# Patient Record
Sex: MALE | Race: WHITE | NOT HISPANIC OR LATINO | Employment: FULL TIME | URBAN - NONMETROPOLITAN AREA
[De-identification: names, ages, dates, MRNs, and addresses within clinical notes are randomized per-mention and may not be internally consistent; named-entity substitution may affect disease eponyms.]

---

## 2017-10-09 ENCOUNTER — OFFICE VISIT - GICH (OUTPATIENT)
Dept: FAMILY MEDICINE | Facility: OTHER | Age: 47
End: 2017-10-09

## 2017-10-09 ENCOUNTER — HISTORY (OUTPATIENT)
Dept: FAMILY MEDICINE | Facility: OTHER | Age: 47
End: 2017-10-09

## 2017-10-09 DIAGNOSIS — L57.0 ACTINIC KERATOSIS: ICD-10-CM

## 2017-10-09 DIAGNOSIS — Z00.00 ENCOUNTER FOR GENERAL ADULT MEDICAL EXAMINATION WITHOUT ABNORMAL FINDINGS: ICD-10-CM

## 2017-10-11 ENCOUNTER — HOSPITAL ENCOUNTER (OUTPATIENT)
Dept: LAB | Facility: OTHER | Age: 47
End: 2017-10-11
Attending: FAMILY MEDICINE | Admitting: FAMILY MEDICINE

## 2017-10-11 DIAGNOSIS — Z00.00 ENCOUNTER FOR GENERAL ADULT MEDICAL EXAMINATION WITHOUT ABNORMAL FINDINGS: ICD-10-CM

## 2017-10-11 LAB
A/G RATIO - HISTORICAL: 1.7 (ref 1–2)
ALBUMIN SERPL-MCNC: 4.7 G/DL (ref 3.5–5.7)
ALP SERPL-CCNC: 84 IU/L (ref 34–104)
ALT (SGPT) - HISTORICAL: 31 IU/L (ref 7–52)
ANION GAP - HISTORICAL: 5 (ref 5–18)
AST SERPL-CCNC: 27 IU/L (ref 13–39)
BILIRUB SERPL-MCNC: 0.5 MG/DL (ref 0.3–1)
BUN SERPL-MCNC: 26 MG/DL (ref 7–25)
BUN/CREAT RATIO - HISTORICAL: 20
CALCIUM SERPL-MCNC: 9.9 MG/DL (ref 8.6–10.3)
CHLORIDE SERPLBLD-SCNC: 107 MMOL/L (ref 98–107)
CHOL/HDL RATIO - HISTORICAL: 4.4
CHOLESTEROL TOTAL: 251 MG/DL
CO2 SERPL-SCNC: 25 MMOL/L (ref 21–31)
CREAT SERPL-MCNC: 1.27 MG/DL (ref 0.7–1.3)
GFR IF NOT AFRICAN AMERICAN - HISTORICAL: >60 ML/MIN/1.73M2
GLOBULIN - HISTORICAL: 2.7 G/DL (ref 2–3.7)
GLUCOSE SERPL-MCNC: 95 MG/DL (ref 70–105)
HDLC SERPL-MCNC: 57 MG/DL (ref 23–92)
LDLC SERPL CALC-MCNC: 156 MG/DL
NON-HDL CHOLESTEROL - HISTORICAL: 194 MG/DL
POTASSIUM SERPL-SCNC: 4.3 MMOL/L (ref 3.5–5.1)
PROT SERPL-MCNC: 7.4 G/DL (ref 6.4–8.9)
PROVIDER ORDERDED STATUS - HISTORICAL: ABNORMAL
PSA TOTAL (DIAGNOSTIC) - HISTORICAL: 0.78 NG/ML
SODIUM SERPL-SCNC: 137 MMOL/L (ref 133–143)
TRIGL SERPL-MCNC: 190 MG/DL

## 2017-12-28 NOTE — PROGRESS NOTES
Patient Information     Patient Name MRN Sex Woody Khan [id number] Male 1970      Progress Notes by Calixto Mckay MD at 10/9/2017  3:00 PM     Author:  Calixto Mckay MD Service:  (none) Author Type:  Physician     Filed:  10/9/2017  4:38 PM Encounter Date:  10/9/2017 Status:  Signed     :  Calixto Mckay MD (Physician)            SUBJECTIVE:    Woody Graves is a 47 y.o. male who presents for physical    HPI: He has no acute concerns, he has a history of high cholesterol.  He has two scaled and pink patches on his forehead, one which slightly projects from his left side of forehead.    PROBLEM LIST:  There is no problem list on file for this patient.    PAST MEDICAL HISTORY:No past medical history on file.  SURGICAL HISTORY:  No past surgical history on file.    SOCIAL HISTORY:  Social History     Social History        Marital status:       Spouse name: N/A     Number of children:  N/A     Years of education:  N/A     Occupational History      Not on file.     Social History Main Topics         Smoking status:   Current Some Day Smoker     Smokeless tobacco:   Never Used      Comment: cigars      Alcohol use   Yes      Comment: 6-8 per day on weekends      Drug use:   Not on file     Sexual activity:   Not on file     Other Topics  Concern     Not on file      Social History Narrative     Has had issues with alcohol, binge drinking 10y ago, went to AA.  Now doing well.  , 3 kids age 19, 17, 12.  Work at [employer].             FAMILY HISTORY:  Family History       Problem   Relation Age of Onset     Heart Disease  Father      87       Good Health  Mother      77 - cancer primary unknown       Other  Brother      depression and obestity - age 56       Good Health  Sister      55 and 51         CURRENT MEDICATIONS:   No current outpatient prescriptions on file.     No current facility-administered medications for this visit.   "    Medications have been reviewed by me and are current to the best of my knowledge and ability.    ALLERGIES:  Review of patient's allergies indicates no known allergies.    REVIEW OF SYSTEMS:  General: denies any general problems.  Eyes: denies problems  Ears/Nose/Throat: denies problems  Cardiovascular: denies problems  Respiratory: denies problems  Gastrointestinal: denies problems  Genitourinary: denies problems  Musculoskeletal: denies problems  Skin: denies problems  Neurologic: denies problems  Psychiatric: denies problems  Endocrine: denies problems  Heme/Lymphatic: denies problems  Allergic/Immunologic: denies problems  PHQ Depression Screening 10/9/2017   Date of PHQ exam (doc flow) 10/9/2017   1. Lack of interest/pleasure 0 - Not at all   2. Feeling down/depressed 0 - Not at all   PHQ-2 TOTAL SCORE 0   Some recent data might be hidden       OBJECTIVE:  /82  Pulse 84  Ht 1.765 m (5' 9.5\")  Wt 96.6 kg (213 lb)  BMI 31 kg/m2  EXAM:   General Appearance: Pleasant, alert, appropriate appearance for age. No acute distress  Head Exam: Normal. Normocephalic, atraumatic.  Eye Exam:  Normal external eye, conjunctiva, lids, cornea. JANAK.  Fundoscopic Exam: Normal red reflex and fundoscopic exam.  Ear Exam: Normal TM's bilaterally. Normal auditory canals and external ears. Non-tender.  Nose Exam: Normal external nose, mucus membranes, and septum.  OroPharynx Exam:  Dental hygiene adequate. Normal buccal mucosa. Normal pharynx.  Neck Exam:  Supple, no masses or nodes.  Thyroid Exam: No nodules or enlargement.  Chest/Respiratory Exam: Normal chest wall and respirations. Clear to auscultation.  Breast Exam: No dimpling, nipple retraction or discharge. No masses or nodes.  Cardiovascular Exam: Regular rate and rhythm. S1, S2, no murmur, click, gallop, or rubs.  Gastrointestinal Exam: Soft, non-tender, no masses or organomegaly.  Rectal Exam: Normal sphincter tone. No masses noted.  Genitourinary Exam Male: " Normal male genitalia. No discharge or penile ulcerations. No testicular masses or swelling.  Lymphatic Exam: Non-palpable nodes in neck, clavicular, axillary, or inguinal regions.  Musculoskeletal Exam: Back is straight and non-tender, full ROM of upper and lower extremities.  Foot Exam: Left and right foot: good pedal pulses, no lesions, nail hygiene good.  Skin: 1x2mm AK on forehead, 1mm AK vs Acanthosis  Neurologic Exam: Nonfocal, symmetric DTRs, normal gross motor, tone coordination and no tremor.  Psychiatric Exam: Alert and oriented - appropriate affect.          ASSESSMENT/PLAN:    ICD-10-CM    1. Physical exam Z00.00 PSA, TOTAL      LIPID PANEL      COMPLETE METABOLIC PANEL   2. AK (actinic keratosis) L57.0 Patient requests cryotherapy destruction.  Risks, benefits and alternatives discussed with patient.  They voiced good understanding and had the opportunity to ask questions.  Patient gave consent and proceed with procedure. Three cycles of cryotherapy performed.  Follow up for excisional biopsy if does not resolve, or if recurs.      Mr. De Souza Body mass index is 31 kg/(m^2). This is out of the normal range for a 47 y.o. Normal range for ages 18+ is between 18.5 and 24.9. To lose weight we reviewed risks and benefits of appropriate options such as diet, exercise, and medications. Patient's strategy will be  self-directed nutrition plan and self-directed exercise program  BP Readings from Last 1 Encounters:10/09/17 : 124/82  Mr. De Souza blood pressure is out of the normal range for adults. Per JNC-8 guidelines normal adult blood pressure is < 120/80, pre-hypertensive is between 120/80 and 139/89, and hypertension is 140/90 or greater. Risks of hypertension were discussed. Patient's strategy will be weight loss and reduced salt intake.    Calixto Mckay MD

## 2017-12-30 NOTE — NURSING NOTE
Patient Information     Patient Name MRN Woody Byrd [id number] Male 1970      Nursing Note by Vanesa Hernandez at 10/9/2017  3:00 PM     Author:  Vanesa Hernandez Service:  (none) Author Type:  (none)     Filed:  10/9/2017  3:22 PM Encounter Date:  10/9/2017 Status:  Signed     :  Vanesa Hernandez            Patient presents to the clinic today for a physical.

## 2018-01-25 ENCOUNTER — DOCUMENTATION ONLY (OUTPATIENT)
Dept: FAMILY MEDICINE | Facility: OTHER | Age: 48
End: 2018-01-25

## 2018-01-27 VITALS
HEART RATE: 84 BPM | WEIGHT: 213 LBS | DIASTOLIC BLOOD PRESSURE: 82 MMHG | BODY MASS INDEX: 30.49 KG/M2 | SYSTOLIC BLOOD PRESSURE: 124 MMHG | HEIGHT: 70 IN

## 2018-07-23 NOTE — PROGRESS NOTES
Patient Information     Patient Name  Woody Graves MRN  [id number] Sex  Male   1970      Letter by Calixto Mckay MD at      Author:  Calixto Mckay MD Service:  (none) Author Type:  (none)    Filed:   Date of Service:   Status:  (Other)         Woody Graves  [address] [facility] [address]    10/19/2017      Dear Mr. Graves,    Your lab results are listed below and are acceptable/stable. Your triglyercides and LDL are annmarie but so is the good HDL cholesterol.  Your 10y risk of ASCVD is about 2.9% which is low.  I would suggest focusing on eating veggies and lean meats and avoiding simple carbs. This can lower your triglycerides and LDL.  I would recheck your cholesterol again in 1 yr.  PSA is normal and so is glucose levels.    Contact us with any questions.    I'm sending along your actual numbers so that you will have them for your general interest and your records.       Take Care,   Calixto Mckay MD      Resulted Orders      PSA, TOTAL (Collected: 10/11/2017  7:25 AM)      Result  Value Ref Range    PSA TOTAL (DIAGNOSTIC) 0.781 <=3.100 ng/mL    Narrative      The percentage of Free PSA can be used to enhance the   differentiation of prostate cancer from benign prostatic  disease in subjects whose PSA levels are between 4.00 and  10.00 ng/mL.      The DXI PSA assay is a Chemiluminescent Assay.  Assay values obtained with different assay   methods cannot be used interchangeably due to differences  in assay methods and reagent specificity.       LIPID PANEL (Collected: 10/11/2017  7:25 AM)      Result  Value Ref Range    CHOLESTEROL,TOTAL 251 (H) <200 mg/dL    TRIGLYCERIDES 190 (H) <150 mg/dL    HDL CHOLESTEROL 57 23 - 92 mg/dL    NON-HDL CHOLESTEROL 194 (H) <145 mg/dl    CHOL/HDL RATIO            4.40 <4.50                    LDL CHOLESTEROL 156 (H) <100 mg/dL    PROVIDER ORDERED STATUS FASTING    COMPLETE METABOLIC PANEL (Collected: 10/11/2017  7:25 AM)      Result   Value Ref Range    SODIUM 137 133 - 143 mmol/L    POTASSIUM 4.3 3.5 - 5.1 mmol/L    CHLORIDE 107 98 - 107 mmol/L    CO2,TOTAL 25 21 - 31 mmol/L    ANION GAP 5 5 - 18                    GLUCOSE 95 70 - 105 mg/dL    CALCIUM 9.9 8.6 - 10.3 mg/dL    BUN 26 (H) 7 - 25 mg/dL    CREATININE 1.27 0.70 - 1.30 mg/dL    BUN/CREAT RATIO           20                    GFR if African American >60 >60 ml/min/1.73m2    GFR if not African American >60 >60 ml/min/1.73m2    ALBUMIN 4.7 3.5 - 5.7 g/dL    PROTEIN,TOTAL 7.4 6.4 - 8.9 g/dL    GLOBULIN                  2.7 2.0 - 3.7 g/dL    A/G RATIO 1.7 1.0 - 2.0                    BILIRUBIN,TOTAL 0.5 0.3 - 1.0 mg/dL    ALK PHOSPHATASE 84 34 - 104 IU/L    ALT (SGPT) 31 7 - 52 IU/L    AST (SGOT) 27 13 - 39 IU/L

## 2020-01-11 ENCOUNTER — APPOINTMENT (OUTPATIENT)
Dept: GENERAL RADIOLOGY | Facility: OTHER | Age: 50
End: 2020-01-11
Attending: FAMILY MEDICINE
Payer: COMMERCIAL

## 2020-01-11 ENCOUNTER — HOSPITAL ENCOUNTER (EMERGENCY)
Facility: OTHER | Age: 50
Discharge: OTHER INSTITUTION | End: 2020-01-11
Attending: FAMILY MEDICINE | Admitting: FAMILY MEDICINE
Payer: COMMERCIAL

## 2020-01-11 ENCOUNTER — TRANSFERRED RECORDS (OUTPATIENT)
Dept: HEALTH INFORMATION MANAGEMENT | Facility: OTHER | Age: 50
End: 2020-01-11

## 2020-01-11 VITALS
HEIGHT: 71 IN | OXYGEN SATURATION: 100 % | WEIGHT: 225 LBS | RESPIRATION RATE: 18 BRPM | SYSTOLIC BLOOD PRESSURE: 137 MMHG | HEART RATE: 93 BPM | BODY MASS INDEX: 31.5 KG/M2 | TEMPERATURE: 96.5 F | DIASTOLIC BLOOD PRESSURE: 90 MMHG

## 2020-01-11 DIAGNOSIS — I21.09 ACUTE ST ELEVATION MYOCARDIAL INFARCTION (STEMI) OF ANTERIOR WALL (H): ICD-10-CM

## 2020-01-11 LAB
ALBUMIN SERPL-MCNC: 4.9 G/DL (ref 3.5–5.7)
ALP SERPL-CCNC: 70 U/L (ref 34–104)
ALT SERPL W P-5'-P-CCNC: 21 U/L (ref 7–52)
ANION GAP SERPL CALCULATED.3IONS-SCNC: 12 MMOL/L (ref 3–14)
APTT PPP: 131 SEC (ref 22–37)
AST SERPL W P-5'-P-CCNC: 20 U/L (ref 13–39)
BASOPHILS # BLD AUTO: 0.1 10E9/L (ref 0–0.2)
BASOPHILS NFR BLD AUTO: 0.7 %
BILIRUB SERPL-MCNC: 0.5 MG/DL (ref 0.3–1)
BUN SERPL-MCNC: 23 MG/DL (ref 7–25)
CALCIUM SERPL-MCNC: 9.8 MG/DL (ref 8.6–10.3)
CHLORIDE SERPL-SCNC: 103 MMOL/L (ref 98–107)
CO2 SERPL-SCNC: 24 MMOL/L (ref 21–31)
CREAT SERPL-MCNC: 1.33 MG/DL (ref 0.7–1.3)
DIFFERENTIAL METHOD BLD: ABNORMAL
EOSINOPHIL # BLD AUTO: 0.1 10E9/L (ref 0–0.7)
EOSINOPHIL NFR BLD AUTO: 1.1 %
ERYTHROCYTE [DISTWIDTH] IN BLOOD BY AUTOMATED COUNT: 12.5 % (ref 10–15)
GFR SERPL CREATININE-BSD FRML MDRD: 57 ML/MIN/{1.73_M2}
GLUCOSE SERPL-MCNC: 128 MG/DL (ref 70–105)
HCT VFR BLD AUTO: 48.4 % (ref 40–53)
HGB BLD-MCNC: 16.4 G/DL (ref 13.3–17.7)
IMM GRANULOCYTES # BLD: 0.1 10E9/L (ref 0–0.4)
IMM GRANULOCYTES NFR BLD: 0.4 %
INR PPP: 0.99 (ref 0–1.3)
LYMPHOCYTES # BLD AUTO: 1.8 10E9/L (ref 0.8–5.3)
LYMPHOCYTES NFR BLD AUTO: 14.1 %
MCH RBC QN AUTO: 29.5 PG (ref 26.5–33)
MCHC RBC AUTO-ENTMCNC: 33.9 G/DL (ref 31.5–36.5)
MCV RBC AUTO: 87 FL (ref 78–100)
MONOCYTES # BLD AUTO: 0.9 10E9/L (ref 0–1.3)
MONOCYTES NFR BLD AUTO: 7.3 %
NEUTROPHILS # BLD AUTO: 9.9 10E9/L (ref 1.6–8.3)
NEUTROPHILS NFR BLD AUTO: 76.4 %
PLATELET # BLD AUTO: 288 10E9/L (ref 150–450)
POTASSIUM SERPL-SCNC: 4 MMOL/L (ref 3.5–5.1)
PROT SERPL-MCNC: 7.1 G/DL (ref 6.4–8.9)
RBC # BLD AUTO: 5.55 10E12/L (ref 4.4–5.9)
SODIUM SERPL-SCNC: 139 MMOL/L (ref 134–144)
TROPONIN I SERPL-MCNC: 377.1 PG/ML
WBC # BLD AUTO: 13 10E9/L (ref 4–11)

## 2020-01-11 PROCEDURE — 25800030 ZZH RX IP 258 OP 636: Performed by: FAMILY MEDICINE

## 2020-01-11 PROCEDURE — 85610 PROTHROMBIN TIME: CPT | Performed by: FAMILY MEDICINE

## 2020-01-11 PROCEDURE — 93010 ELECTROCARDIOGRAM REPORT: CPT | Performed by: INTERNAL MEDICINE

## 2020-01-11 PROCEDURE — 80053 COMPREHEN METABOLIC PANEL: CPT | Performed by: FAMILY MEDICINE

## 2020-01-11 PROCEDURE — 99291 CRITICAL CARE FIRST HOUR: CPT | Performed by: FAMILY MEDICINE

## 2020-01-11 PROCEDURE — 96374 THER/PROPH/DIAG INJ IV PUSH: CPT | Performed by: FAMILY MEDICINE

## 2020-01-11 PROCEDURE — 85730 THROMBOPLASTIN TIME PARTIAL: CPT | Performed by: FAMILY MEDICINE

## 2020-01-11 PROCEDURE — 25000132 ZZH RX MED GY IP 250 OP 250 PS 637: Performed by: FAMILY MEDICINE

## 2020-01-11 PROCEDURE — 71045 X-RAY EXAM CHEST 1 VIEW: CPT

## 2020-01-11 PROCEDURE — 85025 COMPLETE CBC W/AUTO DIFF WBC: CPT | Performed by: FAMILY MEDICINE

## 2020-01-11 PROCEDURE — 25000128 H RX IP 250 OP 636: Performed by: FAMILY MEDICINE

## 2020-01-11 PROCEDURE — 84484 ASSAY OF TROPONIN QUANT: CPT | Performed by: FAMILY MEDICINE

## 2020-01-11 PROCEDURE — 93005 ELECTROCARDIOGRAM TRACING: CPT | Performed by: FAMILY MEDICINE

## 2020-01-11 PROCEDURE — 96375 TX/PRO/DX INJ NEW DRUG ADDON: CPT | Performed by: FAMILY MEDICINE

## 2020-01-11 PROCEDURE — 36415 COLL VENOUS BLD VENIPUNCTURE: CPT | Performed by: FAMILY MEDICINE

## 2020-01-11 PROCEDURE — 99291 CRITICAL CARE FIRST HOUR: CPT | Mod: Z6 | Performed by: FAMILY MEDICINE

## 2020-01-11 RX ORDER — NALOXONE HYDROCHLORIDE 0.4 MG/ML
.1-.4 INJECTION, SOLUTION INTRAMUSCULAR; INTRAVENOUS; SUBCUTANEOUS
Status: DISCONTINUED | OUTPATIENT
Start: 2020-01-11 | End: 2020-01-11 | Stop reason: HOSPADM

## 2020-01-11 RX ORDER — METOPROLOL TARTRATE 25 MG/1
25 TABLET, FILM COATED ORAL ONCE
Status: COMPLETED | OUTPATIENT
Start: 2020-01-11 | End: 2020-01-11

## 2020-01-11 RX ORDER — MORPHINE SULFATE 2 MG/ML
2 INJECTION, SOLUTION INTRAMUSCULAR; INTRAVENOUS
Status: COMPLETED | OUTPATIENT
Start: 2020-01-11 | End: 2020-01-11

## 2020-01-11 RX ORDER — SODIUM CHLORIDE 9 MG/ML
INJECTION, SOLUTION INTRAVENOUS CONTINUOUS
Status: DISCONTINUED | OUTPATIENT
Start: 2020-01-11 | End: 2020-01-11 | Stop reason: HOSPADM

## 2020-01-11 RX ORDER — NITROGLYCERIN 10 MG/100ML
.07-2 INJECTION INTRAVENOUS CONTINUOUS
Status: DISCONTINUED | OUTPATIENT
Start: 2020-01-11 | End: 2020-01-11 | Stop reason: HOSPADM

## 2020-01-11 RX ORDER — ASPIRIN 81 MG/1
324 TABLET, CHEWABLE ORAL ONCE
Status: COMPLETED | OUTPATIENT
Start: 2020-01-11 | End: 2020-01-11

## 2020-01-11 RX ORDER — ASPIRIN 81 MG/1
324 TABLET, CHEWABLE ORAL ONCE
Status: DISCONTINUED | OUTPATIENT
Start: 2020-01-11 | End: 2020-01-11

## 2020-01-11 RX ORDER — HEPARIN SODIUM 5000 [USP'U]/.5ML
4000 INJECTION, SOLUTION INTRAVENOUS; SUBCUTANEOUS ONCE
Status: COMPLETED | OUTPATIENT
Start: 2020-01-11 | End: 2020-01-11

## 2020-01-11 RX ORDER — NITROGLYCERIN 0.4 MG/1
0.4 TABLET SUBLINGUAL EVERY 5 MIN PRN
Status: DISCONTINUED | OUTPATIENT
Start: 2020-01-11 | End: 2020-01-11 | Stop reason: HOSPADM

## 2020-01-11 RX ADMIN — HEPARIN SODIUM 4000 UNITS: 10000 INJECTION, SOLUTION INTRAVENOUS; SUBCUTANEOUS at 15:09

## 2020-01-11 RX ADMIN — MORPHINE SULFATE 2 MG: 2 INJECTION, SOLUTION INTRAMUSCULAR; INTRAVENOUS at 15:38

## 2020-01-11 RX ADMIN — METOPROLOL TARTRATE 25 MG: 25 TABLET ORAL at 15:12

## 2020-01-11 RX ADMIN — ASPIRIN 81 MG 324 MG: 81 TABLET ORAL at 15:04

## 2020-01-11 RX ADMIN — Medication: at 15:29

## 2020-01-11 RX ADMIN — SODIUM CHLORIDE 500 ML: 9 INJECTION, SOLUTION INTRAVENOUS at 15:07

## 2020-01-11 RX ADMIN — TICAGRELOR 180 MG: 90 TABLET ORAL at 15:05

## 2020-01-11 ASSESSMENT — ENCOUNTER SYMPTOMS
NEUROLOGICAL NEGATIVE: 1
FEVER: 0
CONSTITUTIONAL NEGATIVE: 1
MUSCULOSKELETAL NEGATIVE: 1
GASTROINTESTINAL NEGATIVE: 1
EYES NEGATIVE: 1

## 2020-01-11 ASSESSMENT — MIFFLIN-ST. JEOR: SCORE: 1907.72

## 2020-01-11 NOTE — ED PROVIDER NOTES
History     Chief Complaint   Patient presents with     Chest Pain     left arm tingling and pain under left armpit     HPI  Woody Graves is a 49 year old male who noted some discomfort in his left chest pectoralis muscle region last night after going to bed with some intermittent radiation down his left arm.  He tried to ignore this overnight and this morning he was up doing some chores around home and walking out to his tool shed he noticed decreased exercise tolerance and felt a little bit more winded than usual.  He was up on his roof cleaning snow from around his furnace vent and developed 4-5 out of 10 discomfort in left cardial chest radiating down his left arm and felt sweaty.  He arrives to the emergency department stiffness for further evaluation.  He does have family history of early MI with MI in his father and early 50s.  He has known hypercholesterolemia.  He does not use tobacco.    Allergies:  No Known Allergies    Problem List:    There are no active problems to display for this patient.       Past Medical History:    No past medical history on file.    Past Surgical History:    No past surgical history on file.    Family History:    Family History   Problem Relation Age of Onset     Heart Disease Father         Heart Disease,87     Family History Negative Mother         Good Health,77 - cancer primary unknown     Other - See Comments Brother         depression and obestity - age 56     Family History Negative Sister         Good Health,55 and 51       Social History:  Marital Status:   [2]  Social History     Tobacco Use     Smoking status: Current Some Day Smoker     Smokeless tobacco: Never Used     Tobacco comment: Quit smoking: cigars   Substance Use Topics     Alcohol use: Yes     Comment: Alcoholic Drinks/day: 6-8 per day on weekends     Drug use: Unknown     Types: Other     Comment: Drug use: Not Asked        Medications:    No current outpatient medications on  "file.        Review of Systems   Constitutional: Negative.  Negative for fever.   HENT: Negative.    Eyes: Negative.    Respiratory:        New dyspnea on exertion this morning.   Cardiovascular: Positive for chest pain (Radiating to his left arm.).   Gastrointestinal: Negative.    Genitourinary: Negative.    Musculoskeletal: Negative.    Skin: Negative.    Neurological: Negative.        Physical Exam   BP: (!) 147/101  Pulse: 88  Heart Rate: 87  Temp: 96.5  F (35.8  C)  Resp: 18  Height: 180.3 cm (5' 11\")  Weight: 102.1 kg (225 lb)  SpO2: 100 %      Physical Exam  Vitals signs and nursing note reviewed.   Constitutional:       Appearance: He is well-developed and normal weight. He is diaphoretic.      Comments: Stoic mid aged male minimizing symptoms with some diaphoresis.   HENT:      Head: Normocephalic.      Nose: Nose normal. No congestion.      Mouth/Throat:      Pharynx: Oropharynx is clear.   Eyes:      Extraocular Movements: Extraocular movements intact.      Pupils: Pupils are equal, round, and reactive to light.   Neck:      Musculoskeletal: Normal range of motion and neck supple.   Cardiovascular:      Rate and Rhythm: Normal rate and regular rhythm.      Pulses: Normal pulses.      Heart sounds: Normal heart sounds. No murmur. No friction rub. No gallop.    Pulmonary:      Effort: Pulmonary effort is normal.      Breath sounds: Normal breath sounds. No rales.   Abdominal:      General: Abdomen is flat.      Palpations: Abdomen is soft.      Tenderness: There is no abdominal tenderness.   Musculoskeletal: Normal range of motion.         General: No swelling.   Skin:     General: Skin is warm.      Capillary Refill: Capillary refill takes less than 2 seconds.      Findings: No rash.   Neurological:      General: No focal deficit present.      Mental Status: He is alert.      Motor: No weakness.      Coordination: Coordination normal.   Psychiatric:         Mood and Affect: Mood normal.         Behavior: " Behavior normal.         ED Course        Procedures          EKG: Normal sinus rhythm at 77 bpm with ST elevation V1 through 3 and question of some depression in 3 and aVF with Q waves in 3 and aVF no comparison in chart.    Critical Care time:  was 40 minutes for this patient excluding procedures.               Results for orders placed or performed during the hospital encounter of 01/11/20 (from the past 24 hour(s))   Troponin GH   Result Value Ref Range    Troponin 377.1 (H) <34.0 pg/mL   INR   Result Value Ref Range    INR 0.99 0 - 1.3   Partial thromboplastin time   Result Value Ref Range     (HH) 22 - 37 sec   Comprehensive metabolic panel   Result Value Ref Range    Sodium 139 134 - 144 mmol/L    Potassium 4.0 3.5 - 5.1 mmol/L    Chloride 103 98 - 107 mmol/L    Carbon Dioxide 24 21 - 31 mmol/L    Anion Gap 12 3 - 14 mmol/L    Glucose 128 (H) 70 - 105 mg/dL    Urea Nitrogen 23 7 - 25 mg/dL    Creatinine 1.33 (H) 0.70 - 1.30 mg/dL    GFR Estimate 57 (L) >60 mL/min/[1.73_m2]    GFR Estimate If Black 69 >60 mL/min/[1.73_m2]    Calcium 9.8 8.6 - 10.3 mg/dL    Bilirubin Total 0.5 0.3 - 1.0 mg/dL    Albumin 4.9 3.5 - 5.7 g/dL    Protein Total 7.1 6.4 - 8.9 g/dL    Alkaline Phosphatase 70 34 - 104 U/L    ALT 21 7 - 52 U/L    AST 20 13 - 39 U/L   CBC with platelets differential   Result Value Ref Range    WBC 13.0 (H) 4.0 - 11.0 10e9/L    RBC Count 5.55 4.4 - 5.9 10e12/L    Hemoglobin 16.4 13.3 - 17.7 g/dL    Hematocrit 48.4 40.0 - 53.0 %    MCV 87 78 - 100 fl    MCH 29.5 26.5 - 33.0 pg    MCHC 33.9 31.5 - 36.5 g/dL    RDW 12.5 10.0 - 15.0 %    Platelet Count 288 150 - 450 10e9/L    Diff Method Automated Method     % Neutrophils 76.4 %    % Lymphocytes 14.1 %    % Monocytes 7.3 %    % Eosinophils 1.1 %    % Basophils 0.7 %    % Immature Granulocytes 0.4 %    Absolute Neutrophil 9.9 (H) 1.6 - 8.3 10e9/L    Absolute Lymphocytes 1.8 0.8 - 5.3 10e9/L    Absolute Monocytes 0.9 0.0 - 1.3 10e9/L    Absolute  Eosinophils 0.1 0.0 - 0.7 10e9/L    Absolute Basophils 0.1 0.0 - 0.2 10e9/L    Abs Immature Granulocytes 0.1 0 - 0.4 10e9/L   XR Chest Port 1 View    Narrative    EXAM:XR CHEST PORT 1 VW     CLINICAL HISTORY: Patient Age  49 years Additional clinical info:  chest pain     COMPARISON: None      TECHNIQUE: Single view      Impression    IMPRESSION: Normal chest.    ELISA CHRISTINE MD       Medications   sodium chloride 0.9% infusion (has no administration in time range)   HOLD: nitroglycerin IF ( Does not apply Given 1/11/20 1529)   naloxone (NARCAN) injection 0.1-0.4 mg (has no administration in time range)   nitroGLYcerin (NITROSTAT) sublingual tablet 0.4 mg (has no administration in time range)   nitroGLYcerin 25 mg in D5W 250 mL infusion (0 mcg/kg/min × 102.1 kg Intravenous ED Infusing on Admission/transfer 1/11/20 1533)   heparin ANTICOAGULANT injection 4,000 Units (4,000 Units Intravenous Given 1/11/20 1509)   0.9% sodium chloride BOLUS (500 mLs Intravenous New Bag 1/11/20 1507)   morphine (PF) injection 2 mg (2 mg Intravenous Given 1/11/20 1538)   ticagrelor (BRILINTA) tablet 180 mg (180 mg Oral Given 1/11/20 1505)   aspirin (ASA) chewable tablet 324 mg (324 mg Oral Given 1/11/20 1504)   metoprolol tartrate (LOPRESSOR) tablet 25 mg (25 mg Oral Given 1/11/20 1512)     2:58 PM I discussed patient with Dr. Dukes, cardiologist at [facility] accepting patient in air transfer for STEMI.  I reviewed DASH protocol and vital signs and will give 25 mg of Lopressor orally.    3:29 PM patient having a little increase in his chest pain from 2-3 out of 10 and will start nitroglycerin IV drip and titrate to pain control.    Assessments & Plan (with Medical Decision Making)   49-year-old male with onset of stuttering left precordial chest pain intermittently radiating down his left arm last evening and noticing decreased exercise tolerance this morning now having steady left precordial chest pain radiating to his left  arm with associated diaphoresis since climbing up and is rough and uncovering snow from his furnace vent this afternoon.  CAD risk factors include male gender, strong family history of early MI in father, hypercholesterolemia.  Patient will be an air transfer to [facility] Dr. Dukes, in the Cath Lab.    I have reviewed the nursing notes.    I have reviewed the findings, diagnosis, plan and need for follow up with the patient.       New Prescriptions    No medications on file       Final diagnoses:   Acute ST elevation myocardial infarction (STEMI) of anterior wall (H)       1/11/2020   [facility] AND [facility]     Mateusz Fried MD  01/11/20 153       Mateusz Fried MD  01/11/20 0413

## 2020-01-11 NOTE — ED NOTES
[email or web address] arrived at 1525. Patient states pain is about 4/10 in chest under left pectoral per patient nitroglycerin drip given to [employer] crew to start. Patient had 20 g IV in left hand and the 18 g IV in right AC blew per [employer]  and IV was removed by [employer]  at 1543. Patient alert, orientated, talking to staff, slightly diaphoretic, and IV fluids running.

## 2020-01-11 NOTE — ED TRIAGE NOTES
Patient presents via private car C/O chest pain since last night around 2030. States chest discomfort still today. States decreased exercise tolerance this am when trying to do projects in his shop and some shoveling.     Up working on his roof and states he was more winded and needed to take more breaks due to some shortness of breath, pain under left pires and tinging down left arm to fingertips. Came off the roof around 1330, still feeling short of breath and ill, decided to come in and be seen. . Felt this is not good and came in by private car with wife.

## 2020-01-20 LAB
CREAT SERPL-MCNC: 0.99 MG/DL (ref 0.7–1.2)
CREATININE (EXTERNAL): 0.99 MG/DL (ref 0.7–1.2)
GFR ESTIMATED (EXTERNAL): >60 ML/MIN/1.73M2
GFR SERPL CREATININE-BSD FRML MDRD: >60 ML/MIN/1.73M*2
GLUCOSE (EXTERNAL): 95 MG/DL (ref 70–99)
GLUCOSE SERPL-MCNC: 95 MG/DL (ref 70–99)
POTASSIUM (EXTERNAL): 4.1 MEQ/L (ref 3.4–5.1)
POTASSIUM SERPL-SCNC: 4.1 MEQ/L (ref 3.4–5.1)

## 2020-01-21 LAB — EJECTION FRACTION: NORMAL %

## 2020-01-22 ENCOUNTER — TRANSFERRED RECORDS (OUTPATIENT)
Dept: HEALTH INFORMATION MANAGEMENT | Facility: OTHER | Age: 50
End: 2020-01-22

## 2020-01-22 ENCOUNTER — TELEPHONE (OUTPATIENT)
Dept: CARDIAC REHAB | Facility: OTHER | Age: 50
End: 2020-01-22

## 2020-01-22 DIAGNOSIS — I21.02 ST ELEVATION MYOCARDIAL INFARCTION INVOLVING LEFT ANTERIOR DESCENDING (LAD) CORONARY ARTERY (H): Primary | ICD-10-CM

## 2020-01-22 NOTE — TELEPHONE ENCOUNTER
Patient just discharged from [facility], from STEMI with stents x4. Called to set up cardiac rehab. Scheduled 1/27/20 at 12:30. Program explained and questions answered. Instructed in check in at unit 2.

## 2020-01-24 ENCOUNTER — OFFICE VISIT (OUTPATIENT)
Dept: FAMILY MEDICINE | Facility: OTHER | Age: 50
End: 2020-01-24
Attending: FAMILY MEDICINE
Payer: COMMERCIAL

## 2020-01-24 VITALS
WEIGHT: 210.8 LBS | DIASTOLIC BLOOD PRESSURE: 82 MMHG | HEART RATE: 72 BPM | RESPIRATION RATE: 18 BRPM | HEIGHT: 70 IN | BODY MASS INDEX: 30.18 KG/M2 | TEMPERATURE: 96.3 F | SYSTOLIC BLOOD PRESSURE: 110 MMHG | OXYGEN SATURATION: 99 %

## 2020-01-24 DIAGNOSIS — G47.30 SLEEP APNEA, UNSPECIFIED TYPE: ICD-10-CM

## 2020-01-24 DIAGNOSIS — I21.3 ST ELEVATION MYOCARDIAL INFARCTION (STEMI), UNSPECIFIED ARTERY (H): ICD-10-CM

## 2020-01-24 DIAGNOSIS — I25.5 ISCHEMIC CARDIOMYOPATHY: Primary | ICD-10-CM

## 2020-01-24 DIAGNOSIS — Z95.5 S/P DRUG ELUTING CORONARY STENT PLACEMENT: ICD-10-CM

## 2020-01-24 DIAGNOSIS — I25.10 MULTIPLE VESSEL CORONARY ARTERY DISEASE: ICD-10-CM

## 2020-01-24 DIAGNOSIS — R00.1 BRADYCARDIA, UNSPECIFIED: ICD-10-CM

## 2020-01-24 PROCEDURE — 99215 OFFICE O/P EST HI 40 MIN: CPT | Performed by: FAMILY MEDICINE

## 2020-01-24 RX ORDER — SPIRONOLACTONE 25 MG/1
25 TABLET ORAL
COMMUNITY
Start: 2020-01-22 | End: 2020-06-30

## 2020-01-24 RX ORDER — ATORVASTATIN CALCIUM 80 MG/1
80 TABLET, FILM COATED ORAL
COMMUNITY
Start: 2020-01-21 | End: 2020-06-30

## 2020-01-24 RX ORDER — LISINOPRIL 5 MG/1
5 TABLET ORAL
COMMUNITY
Start: 2020-01-22 | End: 2020-02-24

## 2020-01-24 RX ORDER — FUROSEMIDE 40 MG
TABLET ORAL
COMMUNITY
Start: 2020-01-21 | End: 2020-02-24

## 2020-01-24 RX ORDER — ASPIRIN 81 MG/1
81 TABLET, CHEWABLE ORAL
COMMUNITY
Start: 2020-01-22

## 2020-01-24 RX ORDER — METOPROLOL SUCCINATE 25 MG/1
25 TABLET, EXTENDED RELEASE ORAL
COMMUNITY
Start: 2020-01-21 | End: 2020-06-30

## 2020-01-24 RX ORDER — CHOLECALCIFEROL (VITAMIN D3) 125 MCG
CAPSULE ORAL
COMMUNITY

## 2020-01-24 RX ORDER — NITROGLYCERIN 0.4 MG/1
0.4 TABLET SUBLINGUAL
COMMUNITY
Start: 2020-01-21 | End: 2021-05-17

## 2020-01-24 ASSESSMENT — MIFFLIN-ST. JEOR: SCORE: 1819.49

## 2020-01-24 ASSESSMENT — PAIN SCALES - GENERAL: PAINLEVEL: NO PAIN (0)

## 2020-01-24 NOTE — NURSING NOTE
Pt presents to clinic today for hospital follow up and establish care.      Medication Reconciliation: complete  Irma Fried LPN

## 2020-01-24 NOTE — PATIENT INSTRUCTIONS
Cardiac rehab  Potentially return to work in a month  Continue with [facility] cardiology  Referral for sleep study at [facility]  Follow-up in about a month

## 2020-01-24 NOTE — PROGRESS NOTES
"Nursing Notes:   Irma Fried LPN  1/24/2020  8:32 AM  Sign at exiting of workspace  Pt presents to clinic today for hospital follow up and establish care.      Medication Reconciliation: complete  Irma Fried LPN      SUBJECTIVE:  49 year old male presents to follow up on hospitalization at [facility] a STEMI from 1/11/2020 to 1/21/2020.  He was transferred for STEMI and was found to have severe multi-vessel disease. CABG was planned.    As per discharge summary:  \"On the morning of planned CABG, the patient developed acute onset chest pain with anterior ST elevations (recurrent STEMI) and the patient was brought emergently back to the cath lab. On the table, he had cardiogenic shock, VFib cardiac arrest s/p CPR and cardioversion x6 with ROSC. Coronary angiography showed new thrombotic occlusions in the LAD and RCA. The patient was reloaded with ticagrelor and proceeded to undergo successful complex PCI with coronary thrombectomy, PTCA and FOUZIA x2 overlapping to the ostial to mid LAD. Unable to wire occluded D1 despite multiple attempts. S/p PTCA and FOUZIA x1 to the Cx/OM1, PTCA of mid Cx jailed by OM stent, FOUZIA x1 to the distal RCA and PTCA of RPBL with excellent angiographic results.    He did well post complex PCI. He again was monitored closely in the CCU. He was weaned off pressors, extubated and IABP removed without complication. Cerebral Performance Categories (CPC) Scale at discharge = CPC 1. Good cerebral performance: conscious, alert, able to work, might have mild neurologic or psychologic deficit. Echocardiogram obtained in between his STEMIs showed severely reduced overall left ventricular systolic function, EF 22.9%, with anteroseptal mid to apical akinesis and apical akinesis. There was no left ventricular mural thrombus; no indication for anticoagulation. Repeat limited echo prior to discharge revealed LVEF improved to 35-40%. Patient does not meet criteria for LifeVest given EF >35%, VF event " "within 48 hours of AMI (due to ischemia) and no recurrent VT/VF during this hospitalization.    He was preparing to be discharged when he was noted to have sinus pause bradycardia on telemetry while sleeping; patient asymptomatic. EP consult per Dr. Chacon: \"This is classic for a vagal etiology pause at night.\" \"Patient does have symptoms consistent with obstructive sleep apnea. My guess is that this is a physiological vagal response at night, exacerbated by possible sleep apnea.\" He will continue on beta-blockers and be referred for outpatient sleep study to rule out obstructive sleep apnea.\"    Lifelong aspirin and Brilinta for at least 2 years recommended. Started on BB, ACE, furosemide, spironolactone with plans to uptitrate ACE and beta blocker. Repeat ECHO planned in 3-4 months.    Has been out walking. Feels a little fatigued after 15 minutes. No chest pain. Arranged to start cardiac rehab at [facility].    A sleep study was recommended and he was given appointment with Dr Donald in early February.      REVIEW OF SYSTEMS:    Pertinent items are noted in HPI.    Current Outpatient Medications   Medication Sig Dispense Refill     aspirin (ASA) 81 MG chewable tablet 81 mg       atorvastatin (LIPITOR) 80 MG tablet Take 80 mg by mouth       Cholecalciferol (VITAMIN D) 125 MCG (5000 UT) CAPS        furosemide (LASIX) 40 MG tablet        lisinopril (PRINIVIL/ZESTRIL) 5 MG tablet Take 5 mg by mouth       metoprolol succinate ER (TOPROL-XL) 25 MG 24 hr tablet Take 25 mg by mouth       Multiple Vitamins-Minerals (MULTIVITAMIN ADULT PO)        nitroGLYcerin (NITROSTAT) 0.4 MG sublingual tablet Place 0.4 mg under the tongue       spironolactone (ALDACTONE) 25 MG tablet Take 25 mg by mouth       ticagrelor (BRILINTA) 90 MG tablet Take 90 mg by mouth       No Known Allergies     Family History   Problem Relation Age of Onset     Atrial fibrillation Father      Diabetes Father      Lung Cancer Father 62     Colon Cancer " "Mother 77        77 - cancer primary unknown     Alcoholism Brother      Schizophrenia Brother      Obesity Brother      Obesity Sister      Vascular Disease Sister      Diabetes Sister      Heart Disease Maternal Grandfather 41     Heart Disease Paternal Grandfather 65     Obesity Sister      Prostate Cancer No family hx of         OBJECTIVE:  /82   Pulse 72   Temp 96.3  F (35.7  C) (Temporal)   Resp 18   Ht 1.765 m (5' 9.5\")   Wt 95.6 kg (210 lb 12.8 oz)   SpO2 99%   BMI 30.68 kg/m      EXAM:  General Appearance: Alert. No acute distress  Chest/Respiratory Exam: Clear to auscultation bilaterally  Cardiovascular Exam: Regular rate and rhythm. S1, S2, no murmur, gallop, or rubs.  Extremities: 2+ pedal pulses.  No lower extremity edema.  Psychiatric: Normal affect and mentation        ASSESSMENT/PLAN:    ICD-10-CM    1. Ischemic cardiomyopathy I25.5    2. Multiple vessel coronary artery disease I25.10    3. ST elevation myocardial infarction (STEMI), unspecified artery (H) I21.3    4. S/P drug eluting coronary stent placement Z95.5    5. Bradycardia, unspecified R00.1 SLEEP EVALUATION & MANAGEMENT REFERRAL - [facility] and [facility] [phone] (Age 13 and up)   6. Sleep apnea, unspecified type G47.30 SLEEP EVALUATION & MANAGEMENT REFERRAL - [facility] and [facility] [phone] (Age 13 and up)       Recent STEMI followed by V. tach arrest. Has multiple cardiac stents placed.  despite all this, he is doing quite well.  Started on appropriate heart failure medications.  Has a cardiology follow-up in a few days through [facility].  He wonders about cares at [facility] since he works here.  Has his highest potential chance for in-stent thrombosis in the next few months.  I recommend he continue seeing [facility] at this time.  Additionally he will need a follow-up echo and that can help guide further treatments.     Arranged cardiac rehab at [facility].    He had bradycardic pauses noted on telemetry after his STEMI and stent placement.  Cardiology felt this was  related to sleep apnea and recommended sleep study.  He would like to obtain this at [facility].  Referral placed to have a sleep study and then follow-up with Dr. Aden.    Is currently off work as a .  He reports being told by cardiology he should probably be off for about a month.  He should start cardiac rehab and see how that goes before returning to work.  Can follow-up with me in about a month for work clearance unless he is cleared sooner by cardiology.    Greater than 50% of this 46 minute appointment spent on counseling     Omar Rossi MD    This document was prepared using a combination of typing and voice generated software.  While every attempt was made for accuracy, spelling and grammatical errors may exist.

## 2020-01-26 PROBLEM — I21.3 STEMI (ST ELEVATION MYOCARDIAL INFARCTION) (H): Status: ACTIVE | Noted: 2020-01-11

## 2020-01-26 PROBLEM — I25.5 ISCHEMIC CARDIOMYOPATHY: Status: ACTIVE | Noted: 2020-01-18

## 2020-01-26 PROBLEM — I25.10 MULTIPLE VESSEL CORONARY ARTERY DISEASE: Status: ACTIVE | Noted: 2020-01-11

## 2020-01-26 PROBLEM — Z95.5 S/P DRUG ELUTING CORONARY STENT PLACEMENT: Status: ACTIVE | Noted: 2020-01-17

## 2020-01-27 ENCOUNTER — HOSPITAL ENCOUNTER (OUTPATIENT)
Dept: CARDIAC REHAB | Facility: OTHER | Age: 50
End: 2020-01-27
Attending: INTERNAL MEDICINE
Payer: COMMERCIAL

## 2020-01-27 ENCOUNTER — TRANSFERRED RECORDS (OUTPATIENT)
Dept: HEALTH INFORMATION MANAGEMENT | Facility: OTHER | Age: 50
End: 2020-01-27

## 2020-01-27 DIAGNOSIS — I21.02 ST ELEVATION MYOCARDIAL INFARCTION INVOLVING LEFT ANTERIOR DESCENDING (LAD) CORONARY ARTERY (H): ICD-10-CM

## 2020-01-27 PROCEDURE — 40000116 ZZH STATISTIC OP CR VISIT

## 2020-01-27 PROCEDURE — 93798 PHYS/QHP OP CAR RHAB W/ECG: CPT

## 2020-01-27 ASSESSMENT — PATIENT HEALTH QUESTIONNAIRE - PHQ9: SUM OF ALL RESPONSES TO PHQ QUESTIONS 1-9: 2

## 2020-01-28 DIAGNOSIS — I50.21 ACUTE SYSTOLIC HEART FAILURE (H): Primary | ICD-10-CM

## 2020-01-29 ENCOUNTER — HOSPITAL ENCOUNTER (OUTPATIENT)
Dept: CARDIAC REHAB | Facility: OTHER | Age: 50
End: 2020-01-29
Attending: INTERNAL MEDICINE
Payer: COMMERCIAL

## 2020-01-29 DIAGNOSIS — I50.21 ACUTE SYSTOLIC HEART FAILURE (H): ICD-10-CM

## 2020-01-29 LAB
ANION GAP SERPL CALCULATED.3IONS-SCNC: 10 MMOL/L (ref 3–14)
BUN SERPL-MCNC: 25 MG/DL (ref 7–25)
CALCIUM SERPL-MCNC: 10 MG/DL (ref 8.6–10.3)
CHLORIDE SERPL-SCNC: 100 MMOL/L (ref 98–107)
CO2 SERPL-SCNC: 27 MMOL/L (ref 21–31)
CREAT SERPL-MCNC: 1.43 MG/DL (ref 0.7–1.3)
GFR SERPL CREATININE-BSD FRML MDRD: 53 ML/MIN/{1.73_M2}
GLUCOSE SERPL-MCNC: 98 MG/DL (ref 70–105)
POTASSIUM SERPL-SCNC: 4.4 MMOL/L (ref 3.5–5.1)
SODIUM SERPL-SCNC: 137 MMOL/L (ref 134–144)

## 2020-01-29 PROCEDURE — 36415 COLL VENOUS BLD VENIPUNCTURE: CPT | Mod: ZL

## 2020-01-29 PROCEDURE — 40000116 ZZH STATISTIC OP CR VISIT

## 2020-01-29 PROCEDURE — 80048 BASIC METABOLIC PNL TOTAL CA: CPT | Mod: ZL

## 2020-01-29 PROCEDURE — 93798 PHYS/QHP OP CAR RHAB W/ECG: CPT

## 2020-01-29 NOTE — PROGRESS NOTES
In review of cardiac rehab exercise report, patient noted to have 5 beat run of tachycardia possibly ventricular. Rhythm reviewed by Dr. Elizabeth.  Dr. Elizabeth recommended to make contact with Enrike Price NP Cardiology.  Cardiac rehab session report faxed to Cardiology RN at [facility] at 10 am.  Patient notified that [facility] contacted regarding need to review rhythm findings.    Telephone call received from Rajni at Enrike Price's office.  She stated that Cardiac rehab session report was reviewed by Dr. Pretty, cardiology.  Dr. Pretty's recommended:  No changes to plan at this time, Patient is to remain on beta blockers as prescribed and to call if any concerns or changes.  Rajni stated that her office had contacted the patient with these recommendations.

## 2020-01-31 ENCOUNTER — HOSPITAL ENCOUNTER (OUTPATIENT)
Dept: CARDIAC REHAB | Facility: OTHER | Age: 50
End: 2020-01-31
Attending: INTERNAL MEDICINE
Payer: COMMERCIAL

## 2020-01-31 PROCEDURE — 93798 PHYS/QHP OP CAR RHAB W/ECG: CPT

## 2020-01-31 PROCEDURE — 40000116 ZZH STATISTIC OP CR VISIT

## 2020-02-03 ENCOUNTER — HOSPITAL ENCOUNTER (OUTPATIENT)
Dept: CARDIAC REHAB | Facility: OTHER | Age: 50
End: 2020-02-03
Attending: INTERNAL MEDICINE
Payer: COMMERCIAL

## 2020-02-03 PROCEDURE — 40000116 ZZH STATISTIC OP CR VISIT

## 2020-02-03 PROCEDURE — 93798 PHYS/QHP OP CAR RHAB W/ECG: CPT

## 2020-02-05 ENCOUNTER — HOSPITAL ENCOUNTER (OUTPATIENT)
Dept: CARDIAC REHAB | Facility: OTHER | Age: 50
End: 2020-02-05
Attending: INTERNAL MEDICINE
Payer: COMMERCIAL

## 2020-02-05 PROCEDURE — 93798 PHYS/QHP OP CAR RHAB W/ECG: CPT

## 2020-02-05 PROCEDURE — 93797 PHYS/QHP OP CAR RHAB WO ECG: CPT

## 2020-02-05 PROCEDURE — 40000116 ZZH STATISTIC OP CR VISIT

## 2020-02-05 PROCEDURE — 40000575 ZZH STATISTIC OP CARDIAC VISIT #2

## 2020-02-07 ENCOUNTER — HOSPITAL ENCOUNTER (OUTPATIENT)
Dept: CARDIAC REHAB | Facility: OTHER | Age: 50
End: 2020-02-07
Attending: INTERNAL MEDICINE
Payer: COMMERCIAL

## 2020-02-07 PROCEDURE — 93798 PHYS/QHP OP CAR RHAB W/ECG: CPT

## 2020-02-07 PROCEDURE — 40000116 ZZH STATISTIC OP CR VISIT

## 2020-02-10 ENCOUNTER — HOSPITAL ENCOUNTER (OUTPATIENT)
Dept: CARDIAC REHAB | Facility: OTHER | Age: 50
End: 2020-02-10
Attending: INTERNAL MEDICINE
Payer: COMMERCIAL

## 2020-02-10 PROCEDURE — 40000116 ZZH STATISTIC OP CR VISIT

## 2020-02-10 PROCEDURE — 93797 PHYS/QHP OP CAR RHAB WO ECG: CPT

## 2020-02-10 PROCEDURE — 93798 PHYS/QHP OP CAR RHAB W/ECG: CPT

## 2020-02-10 PROCEDURE — 40000575 ZZH STATISTIC OP CARDIAC VISIT #2

## 2020-02-12 ENCOUNTER — THERAPY VISIT (OUTPATIENT)
Dept: SLEEP MEDICINE | Facility: OTHER | Age: 50
End: 2020-02-12
Attending: INTERNAL MEDICINE
Payer: COMMERCIAL

## 2020-02-12 ENCOUNTER — HOSPITAL ENCOUNTER (OUTPATIENT)
Dept: CARDIAC REHAB | Facility: OTHER | Age: 50
End: 2020-02-12
Attending: INTERNAL MEDICINE
Payer: COMMERCIAL

## 2020-02-12 DIAGNOSIS — R06.81 WITNESSED EPISODE OF APNEA: Primary | ICD-10-CM

## 2020-02-12 PROCEDURE — 95810 POLYSOM 6/> YRS 4/> PARAM: CPT

## 2020-02-12 PROCEDURE — 93798 PHYS/QHP OP CAR RHAB W/ECG: CPT

## 2020-02-12 PROCEDURE — 40000116 ZZH STATISTIC OP CR VISIT

## 2020-02-14 ENCOUNTER — HOSPITAL ENCOUNTER (OUTPATIENT)
Dept: CARDIAC REHAB | Facility: OTHER | Age: 50
End: 2020-02-14
Attending: INTERNAL MEDICINE
Payer: COMMERCIAL

## 2020-02-14 PROCEDURE — 93798 PHYS/QHP OP CAR RHAB W/ECG: CPT

## 2020-02-14 PROCEDURE — 40000116 ZZH STATISTIC OP CR VISIT

## 2020-02-17 ENCOUNTER — HOSPITAL ENCOUNTER (OUTPATIENT)
Dept: CARDIAC REHAB | Facility: OTHER | Age: 50
End: 2020-02-17
Attending: INTERNAL MEDICINE
Payer: COMMERCIAL

## 2020-02-17 PROCEDURE — 40000116 ZZH STATISTIC OP CR VISIT

## 2020-02-17 PROCEDURE — 93798 PHYS/QHP OP CAR RHAB W/ECG: CPT

## 2020-02-19 ENCOUNTER — HOSPITAL ENCOUNTER (OUTPATIENT)
Dept: CARDIAC REHAB | Facility: OTHER | Age: 50
End: 2020-02-19
Attending: INTERNAL MEDICINE
Payer: COMMERCIAL

## 2020-02-19 PROCEDURE — 93798 PHYS/QHP OP CAR RHAB W/ECG: CPT

## 2020-02-19 PROCEDURE — 40000116 ZZH STATISTIC OP CR VISIT

## 2020-02-19 PROCEDURE — 40000575 ZZH STATISTIC OP CARDIAC VISIT #2

## 2020-02-19 PROCEDURE — 93797 PHYS/QHP OP CAR RHAB WO ECG: CPT

## 2020-02-21 ENCOUNTER — HOSPITAL ENCOUNTER (OUTPATIENT)
Dept: CARDIAC REHAB | Facility: OTHER | Age: 50
End: 2020-02-21
Attending: INTERNAL MEDICINE
Payer: COMMERCIAL

## 2020-02-21 PROCEDURE — 93798 PHYS/QHP OP CAR RHAB W/ECG: CPT

## 2020-02-21 PROCEDURE — 40000116 ZZH STATISTIC OP CR VISIT

## 2020-02-24 ENCOUNTER — OFFICE VISIT (OUTPATIENT)
Dept: FAMILY MEDICINE | Facility: OTHER | Age: 50
End: 2020-02-24
Attending: FAMILY MEDICINE
Payer: COMMERCIAL

## 2020-02-24 ENCOUNTER — HOSPITAL ENCOUNTER (OUTPATIENT)
Dept: CARDIAC REHAB | Facility: OTHER | Age: 50
End: 2020-02-24
Attending: INTERNAL MEDICINE
Payer: COMMERCIAL

## 2020-02-24 VITALS
RESPIRATION RATE: 16 BRPM | WEIGHT: 204.6 LBS | DIASTOLIC BLOOD PRESSURE: 72 MMHG | TEMPERATURE: 96.3 F | SYSTOLIC BLOOD PRESSURE: 100 MMHG | BODY MASS INDEX: 29.78 KG/M2 | OXYGEN SATURATION: 99 % | HEART RATE: 72 BPM

## 2020-02-24 DIAGNOSIS — I25.10 MULTIPLE VESSEL CORONARY ARTERY DISEASE: ICD-10-CM

## 2020-02-24 DIAGNOSIS — I25.5 ISCHEMIC CARDIOMYOPATHY: Primary | ICD-10-CM

## 2020-02-24 PROCEDURE — 40000116 ZZH STATISTIC OP CR VISIT

## 2020-02-24 PROCEDURE — 99214 OFFICE O/P EST MOD 30 MIN: CPT | Performed by: FAMILY MEDICINE

## 2020-02-24 PROCEDURE — 93798 PHYS/QHP OP CAR RHAB W/ECG: CPT

## 2020-02-24 RX ORDER — FUROSEMIDE 40 MG
20 TABLET ORAL DAILY
Qty: 90 TABLET | Refills: 0 | Status: SHIPPED | OUTPATIENT
Start: 2020-02-24 | End: 2020-06-30

## 2020-02-24 RX ORDER — LISINOPRIL 10 MG/1
15 TABLET ORAL
COMMUNITY
Start: 2020-02-10 | End: 2020-04-07

## 2020-02-24 ASSESSMENT — PAIN SCALES - GENERAL: PAINLEVEL: NO PAIN (0)

## 2020-02-24 NOTE — PROGRESS NOTES
Cardiac Rehab Discharge Summary    Reason for discharge:    Pt stopped in after appointment with Dr. Rossi.  He is cleared to return to work tomorrow and will no longer be attending cardiac rehab    Progress towards goals:  Goals met  Pt has lost 6 pounds since admit along with starting an exercise routine.  He has attended both nutrition classes and made significant diet changes.      Recommendation(s):    Continue home exercise program. Pt started using [employer] and hopes to go there 4 days a week.

## 2020-02-24 NOTE — PROGRESS NOTES
"Nursing Notes:   Irma Fried, LPN  2/24/2020  9:14 AM  Sign at exiting of workspace  Pt presents to clinic today for follow up heart and work release.      Medication Reconciliation: complete  Irma Fried LPN      SUBJECTIVE:  49 year old male presents to follow up on CAD. He was hospitalized at [facility] following a STEMI from 1/11/2020 to 1/21/2020.   Found to have severe multi-vessel disease. CABG was planned, but then developed a recurrent STEMI and was brought urgently back to the Cath Lab.  Went into a V. fib cardiac arrest with ROSC after cardioversion and CPR. Had \"PTCA and FOUZIA x2 overlapping to the ostial to mid LAD. Unable to wire occluded D1 despite multiple attempts. S/p PTCA and FOUZIA x1 to the Cx/OM1, PTCA of mid Cx jailed by OM stent, FOUZIA x1 to the distal RCA and PTCA of RPBL with excellent angiographic results.\"    EF initially was 23% with improvement to 35 to 40% at time of discharge.  He has not had a follow-up echo since then.  Seeing [facility] cardiology PA for follow-up. Has increased lisinopril from 10 to 15 mg daily.  Blood pressures are running low, but he has not noticed lightheadedness or dizziness.    Completing cardiac rehab.  Had 5 beats of V. tach about a month ago, but none since.  He was asymptomatic at the time.  Has been able to complete all daily activities as recently.  Feels able to return to work and is hoping for work clearance.     Had sleep study showing mild FLORIAN.  Dr. Aden noted that patient is at high risk for central sleep apneas and adaptive servo ventilation could not be utilized because of reduced EF.  Has an appointment scheduled with Dr. Aden in April.    Continues with aspirin and Brilinta.  Still on furosemide at 40 mg daily along with spironolactone, Toprol, and lisinopril.    REVIEW OF SYSTEMS:    Pertinent items are noted in HPI.    Current Outpatient Medications   Medication Sig Dispense Refill     aspirin (ASA) 81 MG chewable tablet 81 mg       " atorvastatin (LIPITOR) 80 MG tablet Take 80 mg by mouth       Cholecalciferol (VITAMIN D) 125 MCG (5000 UT) CAPS        furosemide (LASIX) 40 MG tablet        lisinopril (ZESTRIL) 10 MG tablet Take 15 mg by mouth Take 15 mg by mouth       metoprolol succinate ER (TOPROL-XL) 25 MG 24 hr tablet Take 25 mg by mouth       Multiple Vitamins-Minerals (MULTIVITAMIN ADULT PO)        nitroGLYcerin (NITROSTAT) 0.4 MG sublingual tablet Place 0.4 mg under the tongue       spironolactone (ALDACTONE) 25 MG tablet Take 25 mg by mouth       ticagrelor (BRILINTA) 90 MG tablet Take 90 mg by mouth       No Known Allergies     Family History   Problem Relation Age of Onset     Atrial fibrillation Father      Diabetes Father      Lung Cancer Father 62     Colon Cancer Mother 77        77 - cancer primary unknown     Alcoholism Brother      Schizophrenia Brother      Obesity Brother      Obesity Sister      Vascular Disease Sister      Diabetes Sister      Heart Disease Maternal Grandfather 41     Heart Disease Paternal Grandfather 65     Obesity Sister      Prostate Cancer No family hx of         OBJECTIVE:  /72   Pulse 72   Temp 96.3  F (35.7  C) (Temporal)   Resp 16   Wt 92.8 kg (204 lb 9.6 oz)   SpO2 99%   BMI 29.78 kg/m      EXAM:  General Appearance: Alert. No acute distress  Chest/Respiratory Exam: Clear to auscultation bilaterally  Cardiovascular Exam: Regular rate and rhythm. S1, S2, no murmur, gallop, or rubs.  Extremities: No lower extremity edema.  Psychiatric: Normal affect and mentation        ASSESSMENT/PLAN:    ICD-10-CM    1. Ischemic cardiomyopathy I25.5 furosemide (LASIX) 40 MG tablet     Basic Metabolic Panel     CBC W PLT No Diff     Lipid Panel   2. Multiple vessel coronary artery disease I25.10 Lipid Panel     STEMI followed by VElvis tach arrest. Has multiple cardiac stents placed. Doing quite well after intervention.  Remains on appropriate heart failure medications.  Has been following up through  [facility].  Last creatinine of 1.43 was elevated above his usual baseline.  We discussed that if he does not have significant fluid retention, the furosemide is unnecessary and may be contributing to the elevated creatinine.  Try reducing furosemide from 40 down to 20 mg daily.  If he is doing well with the reduced dose, then potentially he could stop furosemide.  Can get Dr. Esparza's opinion about this at appointment in April.  Continue with existing spironolactone, lisinopril, and Toprol doses.    With mild sleep apnea and lack of significant symptoms, recommend waiting to see Dr. aguilar until after he has a follow-up echocardiogram and may.  That will help better define the treatments that can be provided for his sleep apnea since there are number of limitations at this time given heart failure and reduced ejection fraction.    With change in furosemide, plan to recheck BMP.  Can also check a CBC and lipid now that he is been started on Lipitor 80 mg daily.  Results will be available prior to his cardiology appointment with Dr. Esparza.    Given slip to return to full work duties.      Plan to follow-up in June, sooner if needed    Greater than 50% of this 28 minute appointment spent on counseling     Omar Rossi MD    This document was prepared using a combination of typing and voice generated software.  While every attempt was made for accuracy, spelling and grammatical errors may exist.

## 2020-02-24 NOTE — NURSING NOTE
Pt presents to clinic today for follow up heart and work release.      Medication Reconciliation: complete  Irma Fried LPN

## 2020-03-20 DIAGNOSIS — I25.5 ISCHEMIC CARDIOMYOPATHY: ICD-10-CM

## 2020-03-20 DIAGNOSIS — I25.10 MULTIPLE VESSEL CORONARY ARTERY DISEASE: ICD-10-CM

## 2020-03-20 LAB
ANION GAP SERPL CALCULATED.3IONS-SCNC: 10 MMOL/L (ref 3–14)
BUN SERPL-MCNC: 23 MG/DL (ref 7–25)
CALCIUM SERPL-MCNC: 10.2 MG/DL (ref 8.6–10.3)
CHLORIDE SERPL-SCNC: 101 MMOL/L (ref 98–107)
CHOLEST SERPL-MCNC: 151 MG/DL
CO2 SERPL-SCNC: 24 MMOL/L (ref 21–31)
CREAT SERPL-MCNC: 1.24 MG/DL (ref 0.7–1.3)
ERYTHROCYTE [DISTWIDTH] IN BLOOD BY AUTOMATED COUNT: 13.5 % (ref 10–15)
GFR SERPL CREATININE-BSD FRML MDRD: 62 ML/MIN/{1.73_M2}
GLUCOSE SERPL-MCNC: 122 MG/DL (ref 70–105)
HCT VFR BLD AUTO: 45.4 % (ref 40–53)
HDLC SERPL-MCNC: 64 MG/DL (ref 23–92)
HGB BLD-MCNC: 14.7 G/DL (ref 13.3–17.7)
LDLC SERPL CALC-MCNC: 65 MG/DL
MCH RBC QN AUTO: 29.1 PG (ref 26.5–33)
MCHC RBC AUTO-ENTMCNC: 32.4 G/DL (ref 31.5–36.5)
MCV RBC AUTO: 90 FL (ref 78–100)
NONHDLC SERPL-MCNC: 87 MG/DL
PLATELET # BLD AUTO: 256 10E9/L (ref 150–450)
POTASSIUM SERPL-SCNC: 4.4 MMOL/L (ref 3.5–5.1)
RBC # BLD AUTO: 5.05 10E12/L (ref 4.4–5.9)
SODIUM SERPL-SCNC: 135 MMOL/L (ref 134–144)
TRIGL SERPL-MCNC: 112 MG/DL
WBC # BLD AUTO: 6.7 10E9/L (ref 4–11)

## 2020-03-20 PROCEDURE — 80048 BASIC METABOLIC PNL TOTAL CA: CPT | Mod: ZL | Performed by: FAMILY MEDICINE

## 2020-03-20 PROCEDURE — 85027 COMPLETE CBC AUTOMATED: CPT | Mod: ZL | Performed by: FAMILY MEDICINE

## 2020-03-20 PROCEDURE — 36415 COLL VENOUS BLD VENIPUNCTURE: CPT | Mod: ZL | Performed by: FAMILY MEDICINE

## 2020-03-20 PROCEDURE — 80061 LIPID PANEL: CPT | Mod: ZL | Performed by: FAMILY MEDICINE

## 2020-04-07 ENCOUNTER — TELEPHONE (OUTPATIENT)
Dept: FAMILY MEDICINE | Facility: OTHER | Age: 50
End: 2020-04-07

## 2020-04-07 DIAGNOSIS — I25.10 MULTIPLE VESSEL CORONARY ARTERY DISEASE: ICD-10-CM

## 2020-04-07 DIAGNOSIS — I25.5 ISCHEMIC CARDIOMYOPATHY: Primary | ICD-10-CM

## 2020-04-07 RX ORDER — LISINOPRIL 5 MG/1
5 TABLET ORAL DAILY
Qty: 90 TABLET | Refills: 3 | Status: SHIPPED | OUTPATIENT
Start: 2020-04-07 | End: 2021-03-29

## 2020-04-07 RX ORDER — LISINOPRIL 10 MG/1
10 TABLET ORAL DAILY
Qty: 90 TABLET | Refills: 3 | Status: SHIPPED | OUTPATIENT
Start: 2020-04-07 | End: 2021-03-29

## 2020-04-07 NOTE — TELEPHONE ENCOUNTER
Called and spoke to pt after confirming last name and . Told pt that scripts were sent for lisinopril  10mg and 5 mg.    Dav Mariee LPN .......  2020  12:13 PM

## 2020-04-07 NOTE — TELEPHONE ENCOUNTER
Pt is wondering if he can get both 10 mg and 5 mg dosages for his lisinopril.  He has a hard time cutting 10 mg pills in half.  Then he can take 2 pills for correct dosage verses cutting one of them in half.

## 2020-05-18 ENCOUNTER — TRANSFERRED RECORDS (OUTPATIENT)
Dept: HEALTH INFORMATION MANAGEMENT | Facility: OTHER | Age: 50
End: 2020-05-18

## 2020-05-18 LAB — EJECTION FRACTION: 54.6 %

## 2020-06-30 ENCOUNTER — OFFICE VISIT (OUTPATIENT)
Dept: FAMILY MEDICINE | Facility: OTHER | Age: 50
End: 2020-06-30
Attending: FAMILY MEDICINE
Payer: COMMERCIAL

## 2020-06-30 VITALS
WEIGHT: 202.6 LBS | OXYGEN SATURATION: 99 % | HEART RATE: 56 BPM | RESPIRATION RATE: 16 BRPM | HEIGHT: 70 IN | TEMPERATURE: 97.2 F | BODY MASS INDEX: 29.01 KG/M2 | DIASTOLIC BLOOD PRESSURE: 78 MMHG | SYSTOLIC BLOOD PRESSURE: 110 MMHG

## 2020-06-30 DIAGNOSIS — Z15.01 BRCA1 GENETIC CARRIER: ICD-10-CM

## 2020-06-30 DIAGNOSIS — Z15.09 BRCA1 GENETIC CARRIER: ICD-10-CM

## 2020-06-30 DIAGNOSIS — I25.10 MULTIPLE VESSEL CORONARY ARTERY DISEASE: ICD-10-CM

## 2020-06-30 DIAGNOSIS — I25.5 ISCHEMIC CARDIOMYOPATHY: Primary | ICD-10-CM

## 2020-06-30 PROCEDURE — 99214 OFFICE O/P EST MOD 30 MIN: CPT | Performed by: FAMILY MEDICINE

## 2020-06-30 RX ORDER — SPIRONOLACTONE 25 MG/1
25 TABLET ORAL DAILY
Qty: 90 TABLET | Refills: 3 | Status: SHIPPED | OUTPATIENT
Start: 2020-06-30 | End: 2021-05-17

## 2020-06-30 RX ORDER — METOPROLOL SUCCINATE 25 MG/1
25 TABLET, EXTENDED RELEASE ORAL DAILY
Qty: 90 TABLET | Refills: 3 | Status: SHIPPED | OUTPATIENT
Start: 2020-06-30 | End: 2021-05-17

## 2020-06-30 RX ORDER — ATORVASTATIN CALCIUM 80 MG/1
80 TABLET, FILM COATED ORAL DAILY
Qty: 90 TABLET | Refills: 3 | Status: SHIPPED | OUTPATIENT
Start: 2020-06-30 | End: 2021-05-17

## 2020-06-30 ASSESSMENT — PAIN SCALES - GENERAL: PAINLEVEL: NO PAIN (0)

## 2020-06-30 ASSESSMENT — MIFFLIN-ST. JEOR: SCORE: 1777.3

## 2020-06-30 NOTE — NURSING NOTE
Patient presents today for 6 month follow up on heart attack.  Medication Reconciliation Complete    Loraine Cabral LPN  6/30/2020 9:00 AM

## 2020-06-30 NOTE — PROGRESS NOTES
"Nursing Notes:   Loraine Cabral LPN  6/30/2020  9:20 AM  Signed  Patient presents today for 6 month follow up on heart attack.  Medication Reconciliation Complete    Loraine GUPTA Vivianyazmin, LPN  6/30/2020 9:00 AM      SUBJECTIVE:  50 year old male presents to follow up on CAD.     He was hospitalized at [facility] following a STEMI from 1/11/2020 to 1/21/2020. Found to have severe multi-vessel disease. CABG was planned, but then developed a recurrent STEMI and was brought urgently back to the Cath Lab.  Went into a V. fib cardiac arrest with ROSC after cardioversion and CPR. Had \"PTCA and FOUZIA x2 overlapping to the ostial to mid LAD. Unable to wire occluded D1 despite multiple attempts. S/p PTCA and FOUZIA x1 to the Cx/OM1, PTCA of mid Cx jailed by OM stent, FOUZIA x1 to the distal RCA and PTCA of RPBL with excellent angiographic results.\"    EF initially was 23% with improvement to 35 to 40% at time of discharge.  Recent echocardiogram May 18 at [facility] showed improved EF, 55%    He saw Dr Esparza in April for follow up and lisinopril increased to 15 mg daily. Follow-up recommended with cardiology in a year.     Feels lightheaded at times, but generally tolerating medications. No chest pain or SOB. Back to regular work.    Furosemide reduced from 40 mg to 20 mg daily in February. Cardiology did not have him stop. He has no leg edema. Still on spironolactone, Toprol, and lisinopril.    REVIEW OF SYSTEMS:    Pertinent items are noted in HPI.    Current Outpatient Medications   Medication Sig Dispense Refill     aspirin (ASA) 81 MG chewable tablet 81 mg       atorvastatin (LIPITOR) 80 MG tablet Take 80 mg by mouth       Cholecalciferol (VITAMIN D) 125 MCG (5000 UT) CAPS        furosemide (LASIX) 40 MG tablet Take 0.5 tablets (20 mg) by mouth daily 90 tablet 0     lisinopril (ZESTRIL) 10 MG tablet Take 1 tablet (10 mg) by mouth daily Along with 5 mg pill for 15 mg by mouth daily 90 tablet 3     lisinopril (ZESTRIL) 5 MG tablet " "Take 1 tablet (5 mg) by mouth daily Along with 10 mg pill for 15 mg by mouth daily 90 tablet 3     metoprolol succinate ER (TOPROL-XL) 25 MG 24 hr tablet Take 25 mg by mouth       Multiple Vitamins-Minerals (MULTIVITAMIN ADULT PO)        nitroGLYcerin (NITROSTAT) 0.4 MG sublingual tablet Place 0.4 mg under the tongue       spironolactone (ALDACTONE) 25 MG tablet Take 25 mg by mouth       ticagrelor (BRILINTA) 90 MG tablet Take 90 mg by mouth       No Known Allergies     Family History   Problem Relation Age of Onset     Atrial fibrillation Father      Diabetes Father      Lung Cancer Father 62     Colon Cancer Mother 77        77 - cancer primary unknown     Alcoholism Brother      Schizophrenia Brother      Obesity Brother      Obesity Sister      Vascular Disease Sister      Diabetes Sister      Heart Disease Maternal Grandfather 41     Heart Disease Paternal Grandfather 65     Obesity Sister      Prostate Cancer No family hx of         OBJECTIVE:  /78   Pulse 56   Temp 97.2  F (36.2  C)   Resp 16   Ht 1.765 m (5' 9.5\")   Wt 91.9 kg (202 lb 9.6 oz)   SpO2 99%   BMI 29.49 kg/m      EXAM:  General Appearance: Alert. No acute distress  Chest/Respiratory Exam: Clear to auscultation bilaterally  Cardiovascular Exam: Regular rate and rhythm. S1, S2, no murmur, gallop, or rubs.  Extremities: No lower extremity edema.  Psychiatric: Normal affect and mentation        ASSESSMENT/PLAN:    ICD-10-CM    1. Ischemic cardiomyopathy  I25.5 Basic Metabolic Panel     metoprolol succinate ER (TOPROL-XL) 25 MG 24 hr tablet     spironolactone (ALDACTONE) 25 MG tablet     atorvastatin (LIPITOR) 80 MG tablet     ticagrelor (BRILINTA) 90 MG tablet   2. Multiple vessel coronary artery disease  I25.10 Basic Metabolic Panel     ticagrelor (BRILINTA) 90 MG tablet   3. BRCA1 genetic carrier  Z15.01 CANCER RISK MGMT/CANCER GENETIC COUNSELING REFERRAL    Z15.09      On appropriate cardiac medications.  EF has improved.  He is doing " well with no anginal symptoms.  He is to take aspirin and Brilinta for 2 years.    We discussed how furosemide offers no longevity benefit.  It does control symptoms.  He has no swelling.  Stop furosemide and see if this is needed.  Cardiology suggested further increase in lisinopril if he would tolerate it.  Current systolic blood pressure is on the low side.  Additionally he needs follow-up on potassium level with recent lisinopril increase and then cessation of furosemide.    Continue with same 15 mg lisinopril daily for now.  Stop furosemide.  Plan BMP in a few weeks  Follow up in clinic approx 6 months if BMP normal      He has questions about BRCA status.  Another family member has done a lot of research and found that Ty may be a BRCA carrier.  He is interested in genetic testing to see if he passed on the gene to his daughters.  We discussed that typically genetic counseling is required before the testing.  Referral placed.        Omar Rossi MD    This document was prepared using a combination of typing and voice generated software.  While every attempt was made for accuracy, spelling and grammatical errors may exist.

## 2020-07-01 ENCOUNTER — TELEPHONE (OUTPATIENT)
Dept: FAMILY MEDICINE | Facility: OTHER | Age: 50
End: 2020-07-01

## 2020-07-01 NOTE — LETTER
"    July 2, 2020       TO: Woody Graves  [address] [address]         Dear Mr. Graves,    Our records indicate that you have not scheduled an appointment for a consult, as recommended by Dr. Omar Rossi. If you wish to schedule within [facility], we have several options to help you schedule your appointment:      Call [phone]    Visit our website at [email or web address] and click \"I Want To\" (in upper right) and select Request an Appointment    Request an appointment via [employer] at [email or web address]     If you have chosen to schedule elsewhere or if you have already made an appointment, please disregard this letter.    If you have any questions or concerns regarding the information above, please contact the [facility] at [phone].      Sincerely,      [facility]                  "

## 2020-07-03 NOTE — TELEPHONE ENCOUNTER
ONCOLOGY INTAKE: Records Information      APPT INFORMATION:  Referring provider:  Dr. Omar Rossi MD  Referring provider s clinic:  [facility]  Reason for visit/diagnosis:  BRCA1 genetic carrier  Has patient been notified of appointment date and time?: Yes    RECORDS INFORMATION:  Were the records received with the referral (via [employer])? No    Has patient been seen for any external appt for this diagnosis? Yes    If yes, where? [facility]    Has patient had any imaging or procedures outside of [place]  [facility] for this condition? Yes      If Yes, where? [facility]    ADDITIONAL INFORMATION:  Patient can't do a video visit and wanted a telephone visit.

## 2020-07-13 ENCOUNTER — VIRTUAL VISIT (OUTPATIENT)
Dept: ONCOLOGY | Facility: CLINIC | Age: 50
End: 2020-07-13
Attending: FAMILY MEDICINE
Payer: COMMERCIAL

## 2020-07-13 ENCOUNTER — PRE VISIT (OUTPATIENT)
Dept: ONCOLOGY | Facility: CLINIC | Age: 50
End: 2020-07-13

## 2020-07-13 DIAGNOSIS — Z84.81 FAMILY HISTORY OF GENE MUTATION: Primary | ICD-10-CM

## 2020-07-13 PROCEDURE — 96040 ZZH GENETIC COUNSELING, EACH 30 MINUTES: CPT | Mod: TEL,ZF | Performed by: GENETIC COUNSELOR, MS

## 2020-07-13 NOTE — LETTER
"    Cancer Risk Management  Program Locations    [facility]  [facility]  Mailing Address  Cancer Risk Management Program  [facility]  [address]  [facility] [address]  [place] [address]    New patient appointments  [phone]  July 15, 2020    Woody Graves  [address] [address]      Dear Woody,    It was a pleasure speaking with you on July 13, 2020. Here is a copy of the progress note from your recent genetic counseling visit to the Cancer Risk Management Program. If you have any additional questions, please feel free to call.    7/13/2020    Woody Graves is a 50 year old male who is being evaluated via a billable telephone visit.      The patient has been notified of following:     \"This telephone visit will be conducted via a call between you and your provider. We have found that certain health care needs can be provided without the need for a physical exam. This service lets us provide the care you need with a short phone conversation. If lab work is needed we can place an order for that and you can then stop by our lab to have the test done at a later time.    Telephone visits are billed at different rates depending on your insurance coverage. During this emergency period, for some insurers they may be billed the same as an in-person visit. Please reach out to your insurance provider with any questions.    If during the course of the call the provider feels a telephone visit is not appropriate, you will not be charged for this service.\"    Patient has given verbal consent for Telephone visit?  Yes    What phone number would you like to be contacted at? [phone]    How would you like to obtain your AVS? Mail a copy    Time spent over the phone: 38 minutes    Referring Provider: Omar Rossi, " MD    Presenting Information:   Given concerns regarding the potential for COVID-19 exposure during a clinic visit, Woody elected for a telephone genetic counseling visit through the Cancer Risk Management Program to discuss his family history of cancer and BRCA mutation. We reviewed this history, cancer screening recommendations, and available genetic testing options.    Personal History:  Woody is a 50 year old male. He does not have any personal history of cancer. Woody has not had a colonoscopy. Apart from a normal PSA drawn approximately six months ago, he does not regularly do any other cancer screening at this time.    Of note, Woody reports that he recently had a heart attack and was diagnosed with hereditary hyperlipidemia several years ago. Per Woody, this diagnosis was made based on blood work alone and has not pursued genetic testing.    Family History: (Please see scanned pedigree for detailed family history information)    Woody's mother was diagnosed with and passed away from metastatic cancer at age 77, likely arising from the colon.    Woody's father is 90 and was diagnosed with lung cancer; he has a history of smoking.    He has six brothers and seven sisters; several likely had a cancer, but Woody was unsure of the details.    One aunt's daughter was diagnosed with ovarian cancer in her 50's and passed away.    One aunt's son passed away from lung cancer; he did not have a history of smoking.    One uncle's daughter passed away at age 2-3 years from leukemia.    Several of the cousins have reportedly pursued genetic testing, which identified a BRCA1 mutation. A copy of a report was not available for review today.    Of note, Woody reports that his maternal grandfather had a heart attack in his 60's and likely had hyperlipidemia. We reviewed that there are several genetic causes for inherited hyperlipidemia/hypercholesterolemia, but they are all typically inherited in an autosomal dominant  pattern. This means his siblings and children could have up to a 50% chance to also have hyperlipidemia/hypercholesterolemia. Woody's close relatives are strongly encouraged to share this family history with their primary care providers (including pediatricians), given the significant risks associated with this condition beginning in childhood. Woody should also consider meeting a genetic counselor in the cardiology clinic, to review this condition and genetic testing options. The scheduling number to coordinate this visit is [phone].     His maternal ethnicity is Nigerien. His paternal ethnicity is Surinamese, Indian, and English. There is no known Ashkenazi Buddhism ancestry on either side of his family.    Discussion:    We reviewed the features of sporadic, familial, and hereditary cancers. Woody's paternal family history is likely consistent with hereditary cancer, given several generations of relatives with cancer and a reported familial BRCA1 mutation. Of note, Woody's maternal family history is currently more consistent with sporadic cancer, given his mother's type of cancer and age at diagnosis.    We discussed the natural history and genetics of BRCA mutations.     We reviewed that HBOC syndrome is caused by mutations in the BRCA1 and BRCA2 genes. Individuals with HBOC syndrome are at increased risk for several different cancers, including breast, ovarian, male breast, prostate, melanoma, and pancreatic cancer. Published screening and/or surgery guidelines are available to help manage these risks, including annual clinical breast exams and regular prostate screening.    We discussed the autosomal dominant inheritance pattern of BRCA mutations. Given the reported family history, Woody has a 12.5% chance to carry the familial BRCA mutation identified in his cousins. Based on his family history, Woody meets current National Comprehensive Cancer Network (NCCN) criteria for genetic testing of the BRCA  genes.    A detailed handout regarding HBOC syndrome and the information we discussed will be mailed to Woody and can be found in the after visit summary. Topics included: inheritance pattern, cancer risks, cancer screening recommendations, and also risks, benefits and limitations of testing.    We discussed that there are additional genes that could cause increased risk for cancer and many of these genes present with overlapping features in a family. As such, without additional information regarding the cancers in Woody's family and a copy of his cousins' genetic testing reports, it is possible that his relatives have a mutation in a different gene that is not BRCA1 or BRCA2. If possible, obtaining this information would help us better understand what genetic testing is appropriate for Woody.     Woody stated that he would be able to obtain this information from his relatives. As such, he requested my e-mail address, so that he may forward me this information after he speaks to his relatives. Once I am able to review this information, we will schedule another telephone consult to discuss appropriate next steps for Woody. He verbalized agreement with this plan.    Plan:  1) Today Woody elected to speak to his relatives, in order to gather additional information regarding the family history and genetic testing results, before pursuing testing himself.  2) Woody was provided my contact information, so that he may contact me after speaking with his relatives.  3) Woody will then be scheduled for another telephone consult, in order to review this information and appropriate next steps.    Maura Gonzales MS, [facility]  Licensed Genetic Counselor  Office: [phone]  Pager: [phone]

## 2020-07-13 NOTE — PROGRESS NOTES
"7/13/2020    Woody Graves is a 50 year old male who is being evaluated via a billable telephone visit.      The patient has been notified of following:     \"This telephone visit will be conducted via a call between you and your provider. We have found that certain health care needs can be provided without the need for a physical exam. This service lets us provide the care you need with a short phone conversation. If lab work is needed we can place an order for that and you can then stop by our lab to have the test done at a later time.    Telephone visits are billed at different rates depending on your insurance coverage. During this emergency period, for some insurers they may be billed the same as an in-person visit. Please reach out to your insurance provider with any questions.    If during the course of the call the provider feels a telephone visit is not appropriate, you will not be charged for this service.\"    Patient has given verbal consent for Telephone visit?  Yes    What phone number would you like to be contacted at? [phone]    How would you like to obtain your AVS? Mail a copy    Time spent over the phone: 38 minutes    Referring Provider: Omar Rossi MD    Presenting Information:   Given concerns regarding the potential for COVID-19 exposure during a clinic visit, Woody elected for a telephone genetic counseling visit through the Cancer Risk Management Program to discuss his family history of cancer and BRCA mutation. We reviewed this history, cancer screening recommendations, and available genetic testing options.    Personal History:  Woody is a 50 year old male. He does not have any personal history of cancer. Woody has not had a colonoscopy. Apart from a normal PSA drawn approximately six months ago, he does not regularly do any other cancer screening at this time.    Of note, Woody reports that he recently had a heart attack and was diagnosed with hereditary hyperlipidemia " several years ago. Per Woody, this diagnosis was made based on blood work alone and has not pursued genetic testing.    Family History: (Please see scanned pedigree for detailed family history information)    Woody's mother was diagnosed with and passed away from metastatic cancer at age 77, likely arising from the colon.    Woody's father is 90 and was diagnosed with lung cancer; he has a history of smoking.    He has six brothers and seven sisters; several likely had a cancer, but Woody was unsure of the details.    One aunt's daughter was diagnosed with ovarian cancer in her 50's and passed away.    One aunt's son passed away from lung cancer; he did not have a history of smoking.    One uncle's daughter passed away at age 2-3 years from leukemia.    Several of the cousins have reportedly pursued genetic testing, which identified a BRCA1 mutation. A copy of a report was not available for review today.    Of note, Woody reports that his maternal grandfather had a heart attack in his 60's and likely had hyperlipidemia. We reviewed that there are several genetic causes for inherited hyperlipidemia/hypercholesterolemia, but they are all typically inherited in an autosomal dominant pattern. This means his siblings and children could have up to a 50% chance to also have hyperlipidemia/hypercholesterolemia. Woody's close relatives are strongly encouraged to share this family history with their primary care providers (including pediatricians), given the significant risks associated with this condition beginning in childhood. Woody should also consider meeting a genetic counselor in the cardiology clinic, to review this condition and genetic testing options. The scheduling number to coordinate this visit is [phone].     His maternal ethnicity is Vincentian. His paternal ethnicity is Egyptian, Omani, and English. There is no known Ashkenazi Episcopalian ancestry on either side of his family.    Discussion:    We reviewed  the features of sporadic, familial, and hereditary cancers. Woody's paternal family history is likely consistent with hereditary cancer, given several generations of relatives with cancer and a reported familial BRCA1 mutation. Of note, Woody's maternal family history is currently more consistent with sporadic cancer, given his mother's type of cancer and age at diagnosis.    We discussed the natural history and genetics of BRCA mutations.     We reviewed that HBOC syndrome is caused by mutations in the BRCA1 and BRCA2 genes. Individuals with HBOC syndrome are at increased risk for several different cancers, including breast, ovarian, male breast, prostate, melanoma, and pancreatic cancer. Published screening and/or surgery guidelines are available to help manage these risks, including annual clinical breast exams and regular prostate screening.    We discussed the autosomal dominant inheritance pattern of BRCA mutations. Given the reported family history, Woody has a 12.5% chance to carry the familial BRCA mutation identified in his cousins. Based on his family history, Woody meets current National Comprehensive Cancer Network (NCCN) criteria for genetic testing of the BRCA genes.    A detailed handout regarding HBOC syndrome and the information we discussed will be mailed to Woody and can be found in the after visit summary. Topics included: inheritance pattern, cancer risks, cancer screening recommendations, and also risks, benefits and limitations of testing.    We discussed that there are additional genes that could cause increased risk for cancer and many of these genes present with overlapping features in a family. As such, without additional information regarding the cancers in Woody's family and a copy of his cousins' genetic testing reports, it is possible that his relatives have a mutation in a different gene that is not BRCA1 or BRCA2. If possible, obtaining this information would help us better  understand what genetic testing is appropriate for Woody.     Woody stated that he would be able to obtain this information from his relatives. As such, he requested my e-mail address, so that he may forward me this information after he speaks to his relatives. Once I am able to review this information, we will schedule another telephone consult to discuss appropriate next steps for Woody. He verbalized agreement with this plan.    Plan:  1) Today Woody elected to speak to his relatives, in order to gather additional information regarding the family history and genetic testing results, before pursuing testing himself.  2) Woody was provided my contact information, so that he may contact me after speaking with his relatives.  3) Woody will then be scheduled for another telephone consult, in order to review this information and appropriate next steps.    Maura Gonzales MS, [facility]  Licensed Genetic Counselor  Office: [phone]  Pager: [phone]

## 2020-07-13 NOTE — LETTER
7/13/2020      RE: Woody Graves  [address] [address]       7/13/2020    Woody Graves is a 50 year old male who is being evaluated via a billable telephone visit.      Time spent over the phone: 38 minutes    Referring Provider: Omar Rossi MD    Presenting Information:   Given concerns regarding the potential for COVID-19 exposure during a clinic visit, Woody elected for a telephone genetic counseling visit through the Cancer Risk Management Program to discuss his family history of cancer and BRCA mutation. We reviewed this history, cancer screening recommendations, and available genetic testing options.    Personal History:  Woody is a 50 year old male. He does not have any personal history of cancer. Woody has not had a colonoscopy. Apart from a normal PSA drawn approximately six months ago, he does not regularly do any other cancer screening at this time.    Of note, Woody reports that he recently had a heart attack and was diagnosed with hereditary hyperlipidemia several years ago. Per Woody, this diagnosis was made based on blood work alone and has not pursued genetic testing.    Family History: (Please see scanned pedigree for detailed family history information)    Woody's mother was diagnosed with and passed away from metastatic cancer at age 77, likely arising from the colon.    Woody's father is 90 and was diagnosed with lung cancer; he has a history of smoking.    He has six brothers and seven sisters; several likely had a cancer, but Woody was unsure of the details.    One aunt's daughter was diagnosed with ovarian cancer in her 50's and passed away.    One aunt's son passed away from lung cancer; he did not have a history of smoking.    One uncle's daughter passed away at age 2-3 years from leukemia.    Several of the cousins have reportedly pursued genetic testing, which identified a BRCA1 mutation. A copy of a report was not available for review today.    Of  note, Woody reports that his maternal grandfather had a heart attack in his 60's and likely had hyperlipidemia. We reviewed that there are several genetic causes for inherited hyperlipidemia/hypercholesterolemia, but they are all typically inherited in an autosomal dominant pattern. This means his siblings and children could have up to a 50% chance to also have hyperlipidemia/hypercholesterolemia. Woody's close relatives are strongly encouraged to share this family history with their primary care providers (including pediatricians), given the significant risks associated with this condition beginning in childhood. Woody should also consider meeting a genetic counselor in the cardiology clinic, to review this condition and genetic testing options. The scheduling number to coordinate this visit is [phone].     His maternal ethnicity is [place]ar[place]uan. His paternal ethnicity is Palauan, Anai, and English. There is no known Ashkenazi Baptism ancestry on either side of his family.    Discussion:    We reviewed the features of sporadic, familial, and hereditary cancers. Woody's paternal family history is likely consistent with hereditary cancer, given several generations of relatives with cancer and a reported familial BRCA1 mutation. Of note, Woody's maternal family history is currently more consistent with sporadic cancer, given his mother's type of cancer and age at diagnosis.    We discussed the natural history and genetics of BRCA mutations.     We reviewed that HBOC syndrome is caused by mutations in the BRCA1 and BRCA2 genes. Individuals with HBOC syndrome are at increased risk for several different cancers, including breast, ovarian, male breast, prostate, melanoma, and pancreatic cancer. Published screening and/or surgery guidelines are available to help manage these risks, including annual clinical breast exams and regular prostate screening.    We discussed the autosomal dominant inheritance pattern of BRCA  mutations. Given the reported family history, Woody has a 12.5% chance to carry the familial BRCA mutation identified in his cousins. Based on his family history, Woody meets current National Comprehensive Cancer Network (NCCN) criteria for genetic testing of the BRCA genes.    A detailed handout regarding HBOC syndrome and the information we discussed will be mailed to Woody and can be found in the after visit summary. Topics included: inheritance pattern, cancer risks, cancer screening recommendations, and also risks, benefits and limitations of testing.    We discussed that there are additional genes that could cause increased risk for cancer and many of these genes present with overlapping features in a family. As such, without additional information regarding the cancers in Woody's family and a copy of his cousins' genetic testing reports, it is possible that his relatives have a mutation in a different gene that is not BRCA1 or BRCA2. If possible, obtaining this information would help us better understand what genetic testing is appropriate for Woody.     Woody stated that he would be able to obtain this information from his relatives. As such, he requested my e-mail address, so that he may forward me this information after he speaks to his relatives. Once I am able to review this information, we will schedule another telephone consult to discuss appropriate next steps for Woody. He verbalized agreement with this plan.    Plan:  1) Today Woody elected to speak to his relatives, in order to gather additional information regarding the family history and genetic testing results, before pursuing testing himself.  2) Woody was provided my contact information, so that he may contact me after speaking with his relatives.  3) Woody will then be scheduled for another telephone consult, in order to review this information and appropriate next steps.    Maura Gonzales MS, [facility]  Licensed Genetic  Counselor  Office: [phone]  Pager: [phone]    Maura Gonzales, GC

## 2020-07-13 NOTE — PATIENT INSTRUCTIONS
Assessing Cancer Risk  Only about 5-10% of cancers are thought to be due to an inherited cancer susceptibility gene.    These families often have:    Several people with the same or related types of cancer    Cancers diagnosed at a young age (before age 50)    Individuals with more than one primary cancer    Multiple generations of the family affected with cancer    BRCA1 and BRCA2 Genes  We each inherit two copies of every gene in our bodies: one from our mother, and one from our father.  Each gene has a specific job to do.  When a gene has a mistake or  mutation  in it, it does not work like it should.  Everyone has two copies of BRCA1 and two copies of BRCA2.  A single mutation in one of the copies of BRCA1 or BRCA2 increases the risk for breast and ovarian cancer, among others.  The risk for pancreatic cancer and melanoma may also be slightly increased in some families.  The tables below list the chance that someone with a BRCA mutation would develop cancer in his or her lifetime1,2,3,4.      Women   Men    General Population BRCA +   General Population BRCA +   Breast 12% 40-85%  Breast <1% ~7%   Ovarian 1-2% 10-40%  Prostate 16% 20%     Inheriting a mutation does not mean a person will develop cancer, but it does significantly increase his or her risk above the general population risk.    A person s ethnic background is also important to consider, as individuals of Ashkenazi L[employer] day ancestry have a higher chance of having a BRCA gene mutation.  There are three BRCA mutations that occur more frequently in this population.     Genetic Testing  Genetic testing involves a simple blood test and will look at the genetic information in the BRCA1 and BRCA2 genes for any harmful mutations that are associated with increased cancer risk.  If possible, it is recommended that the person(s) who has had cancer be tested before other family members.  That person will give us the most useful information about whether or not  a specific gene is associated with the cancer in the family.     Results  There are three possible results of BRCA1 and BRCA2 genetic testing:    Positive--a harmful mutation was identified    Negative--no mutation was identified    Variant of unknown significance--a variation in one of the genes was identified, but it is unclear how this impacts cancer risk in the family    Advantages and Disadvantages  There are advantages and disadvantages to genetic testing of these genes.    Advantages    May clarify your cancer risk    Can help you make medical decisions    May explain the cancers in your family    May give useful information to your family members (if you share your results)    Disadvantages    Possible negative emotional impact of learning about inherited cancer risk    Uncertainty in interpreting a negative test result in some situations    Possible genetic discrimination concerns (see below)    Inheritance   BRCA1 and BRCA2 mutations are inherited in an autosomal dominant pattern.  This means that if a parent has a mutation, each of his or her children will have a 50% chance of inheriting that same mutation.  Therefore, each child--male or female--would have a 50% chance of being at increased risk for developing cancer.                                              Image obtained from Genetics Home Reference, 2013     Genetic Information Nondiscrimination Act (FABIOLA)  FABIOLA is a federal law that protects individuals from health insurance or employment discrimination based on a genetic test result alone.  Although rare, there are currently no legal protections in terms of life insurance, long term care, or disability insurances.  Visit the National Human Genome Research [place] at Genome.gov/73259682 to learn more.    Reducing Cancer Risk  Current screening recommendations for women with a BRCA mutation include1:    Breast:  o Breast awareness starting at age 18  o Clinical breast exams starting at age 25;  repeated every 6-12 months  o Annual breast MRI starting at age 25 (or possibly younger)  o Annual mammogram (consider tomosynthesis) and breast MRI at age 30 (for women with and without a breast cancer history)  o Consideration of preventative mastectomy    Ovarian:   o Consideration of transvaginal ultrasound and CA-125 blood test starting at age 30 (or possibly younger); repeated every 6 months  o Recommendation for surgery to remove the ovaries and fallopian tubes after child bearing or by age 35-40. Women with BRCA2 mutations may delay this surgery until ages 40-45, unless it is warranted to consider sooner based on family history.    Some data suggests that women with BRCA1 mutations may be at slightly higher risk for serous uterine cancer. Information is limited at this time, and further research is needed to better understand this risk. Women with BRCA1 mutations should discuss the risks and benefits of hysterectomy at the time of ovary removal.     Current screening recommendations for men with a BRCA mutation include1:    Breast:  o Self-breast exams starting at age 35  o Annual clinical breast exams starting at age 35    Prostate:   o Recommend prostate cancer screening starting at age 45 for BRCA2 carriers  o Consider prostate cancer screening starting at age 45 for BRCA1 carriers    Both men and women with BRCA mutations should talk to their doctor or genetic counselor about screening for pancreatic cancer and melanoma.  In addition, the age at which to start screening may be modified based on family history of young cancer.    Questions to Think About Regarding Genetic Testing    What effect will the test result have on me and my relationship with my family members if I have an inherited gene mutation?  If I don t have a gene mutation?    Should I share my test results, and how will my family react to this news, which may also affect them?    Are my children ready to learn new information that may one  day affect their own health?    Resources  FORCE: Facing Our Risk of Cancer Empowered [email or web address]   Bright Pink [email or web address]   American Cancer Society (ACS) cancer.org   National Cancer [place] (NCI) cancer.gov     Please call us if you have any questions or concerns.   Cancer Risk Management Program [id number]-C[facility] ([phone])  ? Chris Mckay, MS [facility]  [phone]  ? Morena Otoole, MS, [facility]  [phone]  ? Rebekah Calix, MS, [facility]  [phone]  ? Maura Gonzales, MS, [facility] [phone]  ? Nereida Ramirez, MS, [facility] [phone]  ? Asiya Tipton, MS, [facility]  [phone]  ? Cailin Almaraz, MS, [facility]  [phone]    References:  1. National Comprehensive Cancer Network. Clinical practice guidelines in oncology, colorectal cancer screening. Available online (registration required). 2019.

## 2020-08-24 ENCOUNTER — VIRTUAL VISIT (OUTPATIENT)
Dept: ONCOLOGY | Facility: CLINIC | Age: 50
End: 2020-08-24
Attending: GENETIC COUNSELOR, MS
Payer: COMMERCIAL

## 2020-08-24 DIAGNOSIS — Z80.3 FAMILY HISTORY OF MALIGNANT NEOPLASM OF BREAST: ICD-10-CM

## 2020-08-24 DIAGNOSIS — Z84.81 FAMILY HISTORY OF GENE MUTATION: Primary | ICD-10-CM

## 2020-08-24 PROCEDURE — 40000072 ZZH STATISTIC GENETIC COUNSELING, < 16 MIN: Mod: TEL,ZF | Performed by: GENETIC COUNSELOR, MS

## 2020-08-24 PROCEDURE — 96040 ZZH GENETIC COUNSELING, EACH 30 MINUTES: CPT | Mod: 95 | Performed by: GENETIC COUNSELOR, MS

## 2020-08-24 NOTE — LETTER
Cancer Risk Management  Program Locations    [facility]  [facility]  [facility]  [facility]  [facility]  Mailing Address  Cancer Risk Management Program  [facility]  [address]  [place] [address]    New patient appointments  [phone]  August 26, 2020    Woody Graves  [address] [address]      Dear Wodoy,    It was a pleasure speaking with you again on 8/24/2020. Here is a copy of the progress note from your recent genetic counseling visit to the Cancer Risk Management Program. If you have any additional questions, please feel free to call.    8/24/2020    Referring Provider: Omar Rossi MD    Presenting Information:   I spoke to Woody Graves by phone for a follow-up visit today. He had previously been seen in the Cancer Risk Management Program on 7/13/2020. At that time, Woody elected to gather additional information regarding his family history and familial BRCA1 mutation before proceeding with testing himself.    After providing me copies of several relatives' test reports and additional family history details, Woody was seen today to review this information and follow-up recommendations.    Discussion:    We previously reviewed the details of Woody's family and personal history. Woody reported the following updates to his family history:    One paternal aunt is in her 80's and was diagnosed with uterine and thyroid cancer in her 80's. Her daughter was diagnosed with breast cancer at age 50 and carried the familial BRCA1 mutation.    One paternal aunt was diagnosed with non-Hodgkin's lymphoma and passed away at age 66. Her son passed away at age 48 from an unknown metastatic cancer.     One paternal aunt was diagnosed with and passed away from breast and brain cancer in her 70's. Her daughter has not had a  cancer, but carries the BRCA1 mutation.    One paternal uncle was diagnosed with a blood cancer and passed away at age 40.    One paternal aunt was diagnosed with lymphoma and passed away in her 70's.    One paternal aunt is in her 70's and has never had a cancer, but she does carry the BRCA1 mutation.    Several paternal first cousins, through paternal uncles, have a cancer history including one diagnosed with breast cancer at age 48 (reportedly carries the BRCA1 mutation), one diagnosed with a melanoma at age 35, and one with leukemia at age 2.     Multiple paternal first cousin have not had a cancer, but carry the BRCA1 mutation. Several test reports were provided from [facility], which had identified BRCA1 mutation [id number] ([id number]).    We reviewed that this additional information confirms that Woody's paternal relatives carry a BRCA1 mutation, which has been identified in several paternal cousins related through different aunts and uncles. This means Woody has a 25% chance to carry the familial BRCA1 mutation. Woody verbalized familiarity with the cancer risks and screening recommendations associated with a BRCA1 mutation.    We then discussed that though this familial mutation has been identified and is likely to be the primary explanation for his paternal family history of cancer, not all of his paternal relatives with cancer have undergone genetic testing. As there are several other genes associated with increased risk for the cancers in Woody's family, there remains the possibility that there is more than one genetic risk for cancer in his family. As such, it would be reasonable for Woody to consider a multi-gene panel that contains these additional genes.    We reviewed genetic testing options for hereditary breast and related cancers: 1) BRCA1 specific site analysis only, 2) actionable high/moderate risk custom panel (CustomNext-Cancer, 19 genes), or 3) expanded high and  moderate risk panel (BRCANext-Expanded, 23 genes). Woody expressed interest in genetic testing for the familial BRCA1 mutation alone.    Consent was obtained over the phone. Woody opted to schedule a lab appointment at his convenience. Once a blood sample is collected, BRCA1 specific site analysis will be sent to [facility]. Turn around time: 2-3 weeks.    Medical Management: For Woody, we reviewed that the information from genetic testing may determine:    additional cancer screening for which Woody may qualify (i.e. annual clinical breast exams, regular prostate screening, more frequent dermatologic exams, etc.),    and targeted chemotherapies if he were to develop certain cancers in the future (i.e. PARP inhibitors, etc.).     These recommendations and possible targeted chemotherapies will be discussed in detail once genetic testing is completed.     Plan:  1)  Today Woody elected to proceed with BRCA1 specific site analysis through [facility]. He will schedule a lab appointment at his convenience.  2)  The results should be available in 2-3 weeks after his blood sample is collected.  4)  I will call Woody with the results once they become available.    Time spend over the phone: 20 minutes    Maura Gonzales MS, [facility]  Licensed Genetic Counselor  Office: [phone]  Pager: [phone]

## 2020-08-24 NOTE — PROGRESS NOTES
8/24/2020    Referring Provider: Omar Rossi MD    Presenting Information:   I spoke to Woody Graves by phone for a follow-up visit today. He had previously been seen in the Cancer Risk Management Program on 7/13/2020. At that time, Woody elected to gather additional information regarding his family history and familial BRCA1 mutation before proceeding with testing himself.    After providing me copies of several relatives' test reports and additional family history details, Woody was seen today to review this information and follow-up recommendations.    Discussion:    We previously reviewed the details of Woody's family and personal history. Woody reported the following updates to his family history:    One paternal aunt is in her 80's and was diagnosed with uterine and thyroid cancer in her 80's. Her daughter was diagnosed with breast cancer at age 50 and carried the familial BRCA1 mutation.    One paternal aunt was diagnosed with non-Hodgkin's lymphoma and passed away at age 66. Her son passed away at age 48 from an unknown metastatic cancer.     One paternal aunt was diagnosed with and passed away from breast and brain cancer in her 70's. Her daughter has not had a cancer, but carries the BRCA1 mutation.    One paternal uncle was diagnosed with a blood cancer and passed away at age 40.    One paternal aunt was diagnosed with lymphoma and passed away in her 70's.    One paternal aunt is in her 70's and has never had a cancer, but she does carry the BRCA1 mutation.    Several paternal first cousins, through paternal uncles, have a cancer history including one diagnosed with breast cancer at age 48 (reportedly carries the BRCA1 mutation), one diagnosed with a melanoma at age 35, and one with leukemia at age 2.     Multiple paternal first cousin have not had a cancer, but carry the BRCA1 mutation. Several test reports were provided from [facility] and [facility], which had identified BRCA1 mutation  [id number] ([id number]).    We reviewed that this additional information confirms that Woody's paternal relatives carry a BRCA1 mutation, which has been identified in several paternal cousins related through different aunts and uncles. This means Woody has a 25% chance to carry the familial BRCA1 mutation. Woody verbalized familiarity with the cancer risks and screening recommendations associated with a BRCA1 mutation.    We then discussed that though this familial mutation has been identified and is likely to be the primary explanation for his paternal family history of cancer, not all of his paternal relatives with cancer have undergone genetic testing. As there are several other genes associated with increased risk for the cancers in Woody's family, there remains the possibility that there is more than one genetic risk for cancer in his family. As such, it would be reasonable for Woody to consider a multi-gene panel that contains these additional genes.    We reviewed genetic testing options for hereditary breast and related cancers: 1) BRCA1 specific site analysis only, 2) actionable high/moderate risk custom panel (CustomNext-Cancer, 19 genes), or 3) expanded high and moderate risk panel (BRCANext-Expanded, 23 genes). Woody expressed interest in genetic testing for the familial BRCA1 mutation alone.    Consent was obtained over the phone. Woody opted to schedule a lab appointment at his convenience. Once a blood sample is collected, BRCA1 specific site analysis [facility]. Turn around time: 2-3 weeks.    Medical Management: For Woody, we reviewed that the information from genetic testing may determine:    additional cancer screening for which Woody may qualify (i.e. annual clinical breast exams, regular prostate screening, more frequent dermatologic exams, etc.),    and targeted chemotherapies if he were to develop certain cancers in the future (i.e. PARP inhibitors,  etc.).     These recommendations and possible targeted chemotherapies will be discussed in detail once genetic testing is completed.     Plan:  1)  Today Woody elected to proceed with BRCA1 specific site analysis through [facility]. He will schedule a lab appointment at his convenience.  2)  The results should be available in 2-3 weeks after his blood sample is collected.  4)  I will call Woody with the results once they become available.    Time spend over the phone: 20 minutes    Maura Gonzales MS, [facility]  Licensed Genetic Counselor  Office: [phone]  Pager: [phone]

## 2020-08-26 NOTE — PATIENT INSTRUCTIONS
Assessing Cancer Risk  Only about 5-10% of cancers are thought to be due to an inherited cancer susceptibility gene.    These families often have:    Several people with the same or related types of cancer    Cancers diagnosed at a young age (before age 50)    Individuals with more than one primary cancer    Multiple generations of the family affected with cancer    BRCA1 Genes  We each inherit two copies of every gene in our bodies: one from our mother, and one from our father.  Each gene has a specific job to do.  When a gene has a mistake or  mutation  in it, it does not work like it should.  Everyone has two copies of BRCA1.  A single mutation in one of the copies of BRCA1 increases the risk for breast and ovarian cancer, among others.  The risk for pancreatic cancer and melanoma may also be slightly increased in some families.  The tables below list the chance that someone with a BRCA mutation would develop cancer in his or her lifetime1,2,3,4.      Women   Men    General Population BRCA +   General Population BRCA +   Breast 12% 40-85%  Breast <1% ~7%   Ovarian 1-2% 10-40%  Prostate 16% 20%     Inheriting a mutation does not mean a person will develop cancer, but it does significantly increase his or her risk above the general population risk.    A person s ethnic background is also important to consider, as individuals of Ashkenazi [place] ancestry have a higher chance of having a BRCA gene mutation.  There are three BRCA mutations that occur more frequently in this population.     Genetic Testing  Genetic testing involves a simple blood test and will look at the genetic information in the BRCA1 genes for any harmful mutations that are associated with increased cancer risk.  If possible, it is recommended that the person(s) who has had cancer be tested before other family members.  That person will give us the most useful information about whether or not a specific gene is associated with the cancer in the  family.     Results  There are three possible results of BRCA1 genetic testing:    Positive--a harmful mutation was identified    Negative--no mutation was identified    Variant of unknown significance--a variation in one of the genes was identified, but it is unclear how this impacts cancer risk in the family    Advantages and Disadvantages  There are advantages and disadvantages to genetic testing of these genes.    Advantages    May clarify your cancer risk    Can help you make medical decisions    May explain the cancers in your family    May give useful information to your family members (if you share your results)    Disadvantages    Possible negative emotional impact of learning about inherited cancer risk    Uncertainty in interpreting a negative test result in some situations    Possible genetic discrimination concerns (see below)    Inheritance   BRCA1 mutations are inherited in an autosomal dominant pattern.  This means that if a parent has a mutation, each of his or her children will have a 50% chance of inheriting that same mutation.  Therefore, each child--male or female--would have a 50% chance of being at increased risk for developing cancer.                                              Image obtained from Genetics Home Reference, 2013     Genetic Information Nondiscrimination Act (FABIOLA)  FABIOLA is a federal law that protects individuals from health insurance or employment discrimination based on a genetic test result alone.  Although rare, there are currently no legal protections in terms of life insurance, long term care, or disability insurances.  Visit the National Human Genome Research [place] at Genome.gov/64029869 to learn more.    Reducing Cancer Risk  Current screening recommendations for women with a BRCA mutation include1:    Breast:  o Breast awareness starting at age 18  o Clinical breast exams starting at age 25; repeated every 6-12 months  o Annual breast MRI starting at age 25 (or  possibly younger)  o Annual mammogram (consider tomosynthesis) and breast MRI at age 30 (for women with and without a breast cancer history)  o Consideration of preventative mastectomy    Ovarian:   o Consideration of transvaginal ultrasound and CA-125 blood test starting at age 30 (or possibly younger); repeated every 6 months  o Recommendation for surgery to remove the ovaries and fallopian tubes after child bearing or by age 35-40.    Some data suggests that women with BRCA1 mutations may be at slightly higher risk for serous uterine cancer. Information is limited at this time, and further research is needed to better understand this risk. Women with BRCA1 mutations should discuss the risks and benefits of hysterectomy at the time of ovary removal.     Current screening recommendations for men with a BRCA mutation include1:    Breast:  o Self-breast exams starting at age 35  o Annual clinical breast exams starting at age 35    Prostate:   o Consider prostate cancer screening starting at age 45 for BRCA1 carriers    Both men and women with BRCA mutations should talk to their doctor or genetic counselor about screening for pancreatic cancer and melanoma.  In addition, the age at which to start screening may be modified based on family history of young cancer.    Questions to Think About Regarding Genetic Testing    What effect will the test result have on me and my relationship with my family members if I have an inherited gene mutation?  If I don t have a gene mutation?    Should I share my test results, and how will my family react to this news, which may also affect them?    Are my children ready to learn new information that may one day affect their own health?    Resources  FORCE: Facing Our Risk of Cancer Empowered [email or web address]   Bright Pink bebrightpink.org   American Cancer Society (ACS) cancer.org   National Cancer [place] (NCI) cancer.gov     Please call us if you have any questions or concerns.    Cancer Risk Management Program [id number]-[facility]-CANC[facility] ([phone])  ? Chris Mckay, MS [facility]  [phone]  ? Morena Otoole, MS, [facility]  [phone]  ? Rebekah Fifi, MS, [facility]  [phone]  ? Maura Gonzales, MS, [facility] [phone]  ? Nereida Ramirez, MS, [facility] [phone]  ? Asiya Tipton, MS, [facility]  [phone]  ? Cailin Almaraz, MS, [facility]  [phone]    References:  1. National Comprehensive Cancer Network. Clinical practice guidelines in oncology, colorectal cancer screening. Available online (registration required). 2019.

## 2020-09-03 DIAGNOSIS — Z84.81 FAMILY HISTORY OF GENE MUTATION: ICD-10-CM

## 2020-09-03 DIAGNOSIS — I25.5 ISCHEMIC CARDIOMYOPATHY: ICD-10-CM

## 2020-09-03 DIAGNOSIS — I25.10 MULTIPLE VESSEL CORONARY ARTERY DISEASE: ICD-10-CM

## 2020-09-03 DIAGNOSIS — Z80.3 FAMILY HISTORY OF MALIGNANT NEOPLASM OF BREAST: ICD-10-CM

## 2020-09-03 LAB
ANION GAP SERPL CALCULATED.3IONS-SCNC: 8 MMOL/L (ref 3–14)
BUN SERPL-MCNC: 25 MG/DL (ref 7–25)
CALCIUM SERPL-MCNC: 9.8 MG/DL (ref 8.6–10.3)
CHLORIDE SERPL-SCNC: 104 MMOL/L (ref 98–107)
CO2 SERPL-SCNC: 25 MMOL/L (ref 21–31)
CREAT SERPL-MCNC: 1.25 MG/DL (ref 0.7–1.3)
GFR SERPL CREATININE-BSD FRML MDRD: 61 ML/MIN/{1.73_M2}
GLUCOSE SERPL-MCNC: 107 MG/DL (ref 70–105)
MISCELLANEOUS TEST: NORMAL
POTASSIUM SERPL-SCNC: 4.7 MMOL/L (ref 3.5–5.1)
SODIUM SERPL-SCNC: 137 MMOL/L (ref 134–144)

## 2020-09-03 PROCEDURE — 80048 BASIC METABOLIC PNL TOTAL CA: CPT | Mod: ZL | Performed by: FAMILY MEDICINE

## 2020-09-03 PROCEDURE — 36415 COLL VENOUS BLD VENIPUNCTURE: CPT | Mod: ZL | Performed by: GENETIC COUNSELOR, MS

## 2020-09-17 LAB — LAB SCANNED RESULT: ABNORMAL

## 2020-10-07 ENCOUNTER — TELEPHONE (OUTPATIENT)
Dept: ONCOLOGY | Facility: CLINIC | Age: 50
End: 2020-10-07

## 2020-10-07 NOTE — TELEPHONE ENCOUNTER
10/7/2020    I called Woody today to review this genetic testing results, as the schedulers have been unable to reach him to schedule a formal results appointment. As I was unable to reach him, I left a non-detailed voicemail with my name and phone number.    Maura Gonzales MS, [facility]  Licensed Genetic Counselor  Office: [phone]  Pager: [phone]

## 2020-10-16 ENCOUNTER — TELEPHONE (OUTPATIENT)
Dept: ONCOLOGY | Facility: CLINIC | Age: 50
End: 2020-10-16

## 2020-10-16 NOTE — TELEPHONE ENCOUNTER
10/16/2020    I called Woody again today to discuss his genetic testing results, but was unable to reach him. I left another non-detailed voicemail with my name and phone number.    Maura Gonzales MS, [facility]  Licensed Genetic Counselor  Office: [phone]  Pager: [phone]

## 2020-10-26 ENCOUNTER — TELEPHONE (OUTPATIENT)
Dept: ONCOLOGY | Facility: CLINIC | Age: 50
End: 2020-10-26

## 2020-10-26 DIAGNOSIS — Z15.09 BRCA1 GENE MUTATION POSITIVE: ICD-10-CM

## 2020-10-26 DIAGNOSIS — Z15.01 BRCA1 GENE MUTATION POSITIVE: ICD-10-CM

## 2020-10-26 NOTE — PATIENT INSTRUCTIONS
10/26/2020    Dear Relative:  The purpose of this letter is to inform you that your relative recently underwent genetic counseling and genetic testing due to the family history of cancer. The testing done through [facility] identified a mutation in the BRCA1 gene. Specifically, the mutation is called [id number]. The accession number linked to your relative's test report is [id number].  A mutation (or change in the genetic code) causes a specific gene to stop working properly.  Ultimately, individuals who have a mutation in this BRCA1 gene have a diagnosis of hereditary breast and ovarian cancer syndrome.    BRCA1 mutations increase the lifetime risk of breast cancer up to 50-85%. They also increase the lifetime risk for ovarian cancer up to 20-40%. Men with this BRCA1 mutation are at increased risk for male breast cancer and prostate cancer. Some families with BRCA1 mutations show increased risk of pancreatic cancer or melanoma, as well. In addition, both men and women have a 50% chance of passing this mutation on to each of their children.  If individuals are found to have a mutation in the BRCA1 gene, we would recommend increased cancer screening at younger ages. Risk reduction surgeries are also available options that can prevent the development of certain cancers.    In rare situations in which both parents have a mutation in the BRCA1 gene, their children may each have a 25% risk for Fanconi anemia. Fanconi anemia is a rare condition with onset in childhood that often results in physical abnormalities, growth retardation, bone marrow failure, and increased risk for cancer. If individuals of childbearing age are found to carry a BRCA1 mutation, genetic counseling and genetic testing may be advised for their partners.    I understand that this may be surprising, unexpected, and even scary news. Because this mutation has been identified in your family, you are at risk for having the same mutation. As mentioned  earlier, your children may also be at risk.    Scheduling a genetic counseling appointment does not mean you have to undergo genetic testing.  The decision to pursue such testing is a very personal one that is discussed in more detail during the session. Indeed, much of cancer genetic counseling is providing valuable information to individuals who are impacted by genetic information such as this.    If you are interested in scheduling a genetic counseling appointment at [facility], please call [phone] to schedule an appointment. You can also find a genetic counselor close to you or at another health system at [facility]  Sincerely,   Maura Gonzales MS, [facility]  Licensed Genetic Counselor  [phone]  [email or web address]      Resources for Men with a BRCA1 Mutation    FORCE: Facing Our Risk of Cancer Empowered  www.[employer].org  FORCE s mission is to improve the lives of individuals and families affected by hereditary breast, ovarian, and related cancers by creating awareness, supplying information and support to the community, advocating for and supporting research, and working with the research and medical communities.     Helpline: 1-229.272.7815    Message boards    Peer Navigation and local support groups    Information for BRCA+ Men: http://www.[employer]our[employer].org/understanding-brca-and-hboc/information/previvors-survivors/men/basics/overview.php   Talking About BRCA in Your Family Tree  http://www.facingour[employer].org/understanding-brca-and-hboc/publications/documents/cabylpy-dsupwxu-xnfgi-brca-family.pdf  Prostate Cancer Foundation www.pcf.org/   The Prostate Cancer Foundation was started in 2003. This organization funds research for prevention, detection, and treatment, and hopefully one day a cure for prostate cancer.   Us TOO www.ustoo.org  UsTOO was started by men in [place] in 1990. This is now an international effort to provide education and support to men diagnosed with  prostate cancer.  This website also has a prostate cancer support group search function to locate a support group near you.  Pancreatic Cancer Action Network www.pancan.org  The Pancreatic Cancer Action Network aims to fight pancreatic cancer, with a goal of doubling survival by 2020. This organization hosts  [place] a walk to support pancreatic cancer research. There are also ways to get involved with this organization through volunteering and advocacy work.    BRCA Information Support Group  People with BRCA1 or BRAC2 mutations face complex emotional challenges and complicated medical decisions due to high cancer risks and their impact on individuals and families. This group brings people with a BRAC1 or BRAC2 genetic diagnosis together with others facing similar challenges. The group meets nine times per year. For information, please contact [email or web address] or call [phone].  [employer] www.[employer].org  [facility] [place] is a 501(c)3 nonprofit and the local affiliate of the Cancer Support Community, a network of more than 54 supportive, free and welcoming  clubhouses  where everyone living with cancer can come for social, emotional and psychological support. Clubs are healing environments where individuals learn from each other with guidance from licensed professionals.  Books:  Confronting Hereditary Breast and Ovarian Cancer: Identify Your Risk, Understand Your Options, Change Your [place], Lakeisha Rice

## 2020-10-26 NOTE — Clinical Note
"Please print and send a copy of this letter, the patient instructions/AVS, and the patient's genetic testing report to the patient.    Please enclose test report: \"Send outs misc test Order: [id number]\"  "

## 2020-10-26 NOTE — TELEPHONE ENCOUNTER
"10/26/2020    Referring Provider: Omar Rossi MD    Presenting Information:   I called Woody again today to discuss his genetic testing results and was unable to reach him. He was previously contacted by the scheduling team to schedule a return visit to review results with me, but they were unable to reach him. I then attempted to reach Woody by phone (please see phone notes from 10/7, 10/16, and 10/26/2020). As I have also been unable to reach Woody to review his genetic testing results, he is being sent this letter. He is strongly encouraged to contact me after receiving this letter, so that we may discuss the results, follow-up recommendations, and implications to family members in more detail.    Woody was originally seen on 7/13 and 8/24/2020 and his blood was drawn on 9/3/2020. We ordered Specific Site Analysis of BRCA1 from [facility] because of his family history of cancer and familial BRCA1 mutation.     Genetic Testing Results: POSITIVE  Woody is POSITIVE for the familial BRCA1 mutation. Specifically his mutation is called [id number]. We discussed that this mutation is associated with a diagnosis of Hereditary Breast and Ovarian Cancer (HBOC) syndrome and increased risk for certain cancers.     Of note, this testing only looked for this one mutation in the BRCA1 gene.      A copy of the test report can be found in the Laboratory tab, dated 9/3/2020, and named \"SEND OUTS MISC TEST\". The report is scanned in as a linked document.    BRCA1 Cancer Risks:  Men with mutations in the BRCA1 gene have a:      16-20% lifetime prostate cancer risk, including a higher likelihood for advanced or metastatic disease. This is greater than the general population risk of approximately 11.6%.      6-8% lifetime male breast cancer risk. This is greater than the general population risk of less than 1%.      Women with mutations in the BRCA1 gene have a:    50-85% lifetime risk of developing breast cancer. This is " much greater than the general population breast cancer risk of 12%    20-40% lifetime risk of developing ovarian cancer, though some studies suggest that this risk may be as high as 60%. This is much greater than the general population ovarian cancer risk of 1-2%.    Lifetime pancreatic cancer risk and melanoma risk in both males and females may be increased as well. Also, though no exact lifetime risks are available at this time, there may be increased risks for other cancers (i.e. uterine cancer).     Cancer Screening and Prevention:  The following screening is recommended for men who have a mutation in the BRCA1 gene, per current National Comprehensive Cancer Network (NCCN) guidelines:    Breast self-exam starting at age 35    Annual clinical breast exams starting at age 35    Consider annual mammogram screening in men with gynecomastia starting at age 50 or 10 years prior to the earliest male breast cancer in the family    Prostate cancer screening can be considered starting at age 40    The following screening is recommended for women who have a mutation in the BRCA1 gene:    Breast awareness starting at age 18    Clinical breast exams starting at age 25; repeated every 6-12 months    Annual breast MRI from age 25-29    Annual mammograms with consideration of tomosynthesis and breast MRI alternating every 6 months starting at age 30    Consider transvaginal ultrasound and a  blood test starting at age 30-35, though the benefits of this screening are unclear.    Prophylactic mastectomy is a surgical option, which reduces breast cancer risk by 90%. Chemoprevention with Tamoxifen can reduce breast cancer risk in some women. Surgical risk reduction options and Tamoxifen use should be discussed with a woman's physician.      Given the limitations of screening for ovarian cancer, prophylactic removal of the ovaries and fallopian tubes is an option and is recommended after women are finished planning their  families or between 35 and 40. As with any surgery, risks are involved, and this option should be discussed in greater detail with a gyn-oncologist.      Some data suggests that there may be an increased risk for serous uterine cancer in women with BRCA1 mutations. However, further research is needed in this area, and data is currently limited. Women with BRCA1 mutations are encouraged to discuss the risks and benefits of hysterectomy at the time of oophorectomy with their provider before surgery.    Using oral contraceptives for five years or more has been shown to reduce ovarian cancer risk by around 50%. The data are still inconclusive as to whether or not using oral contraceptives will increase breast cancer risk in BRCA mutation carriers.     Screening for pancreatic cancer is not offered on a routine basis, as the benefits of current screening methods are unknown. Pancreatic cancer screening may be considered based on family history, though. Screening for melanoma may also be considered. Of note, Woody does not currently have a close family history of either pancreatic cancer or melanoma.  Some chemotherapies for certain cancers may be more effective in individuals with BRCA mutations. Woody should discuss this with his physicians, if chemotherapy is indicated for him in the future.     Other screening based on Woody's personal and family history:    Other population cancer screening options, such as those recommended by the American Cancer Society and NCCN, are also appropriate for Woody and his family.     These screening recommendations may change if there are changes to Woody's personal and/or family history of cancer. Final screening recommendations should be made by each individual's managing physician.    Woody could consider participating in our Cancer Risk Management Program in which our nurse practitioner provides an individual screening plan and assists with medical management. If Woody is  interested in meeting with [facility] for this service, I would be happy to place a referral. He could also consider calling the scheduling number [phone] to schedule this visit.    Of note, the above information is based on our current understanding of Woody's genetic findings. Woody is encouraged to reach out to me regularly and with any pertinent updates to his personal and/or family history of cancer, as our understanding of the genetic findings in his family may change over time.     Implications for Family Members:  Mutations in the BRCA1 gene are inherited in an autosomal dominant pattern. This means that each of Woody's children have a 50% chance of inheriting the same mutation. Likewise, each of his siblings has a 50% risk of having the same mutation. Other extended paternal relatives may also carry this mutation. I am happy to help his relatives connect with a genetic counselor in their area if they would like to discuss testing.    Also, in rare situations in which both parents have a mutation in the BRCA1 gene, their children may each have a 25% risk for Fanconi anemia. Fanconi anemia is a rare condition with onset in childhood that often results in physical abnormalities, growth retardation, bone marrow failure, and increased risk for cancer. For individuals of childbearing age with BRCA1 mutations, genetic counseling and genetic testing may be advised for their partners.    Additional Testing Considerations:  Even though Woody's genetic testing result was positive, other relatives may carry a different gene mutation associated with hereditary cancer. Genetic counseling, with consideration of a multi-gene panel, is recommended for Woody's relatives. If any of these relatives do pursue genetic testing, Woody is encouraged to contact me so that we may review the impact of their test results on him.    Support Resources:  Woody will be mailed information regarding national and  "local support resources including [place] and the American Cancer Society.    Plan:  1.  Woody will be mailed a copy of this letter, his test results, and support resources.  2.  I will provide a \"Dear Relative\" letter for Woody to share with his family members. This will also be mailed to Woody.  3.  He is encouraged to follow up with his medical providers, including Dr. Rossi.  4.  Woody is encouraged to contact me if he is interested in having a referral placed to meet with CLEVELAND Gomez to discuss screening associated with a BRCA1 mutation.    Woody is strongly encouraged him to contact me directly at [phone], if he has any questions regarding this letter, his genetic testing results, and/or anything else we spoke about previously.     Maura Gonzales MS, [facility]  Licensed Genetic Counselor  Office: [phone]  Pager: [phone]  "

## 2020-10-26 NOTE — LETTER
Cancer Risk Management  Program Locations    [facility]  [facility]  [facility]  [facility]  Mailing Address  Cancer Risk Management Program  [facility]  [address]  [place] [address]    New patient appointments  [phone]  October 26, 2020    Woody Graves  [address] [address]      Dear Woody,    I hope this letter finds you well. As I have been unable to reach you by phone, I am sending you this letter summarizing your genetic testing results. I strongly encourage you to contact me after receiving this letter, so that we may discuss the results, follow-up recommendations, and implications to your family members in more detail.    10/26/2020    Referring Provider: Omar Rossi MD    Presenting Information:   I called Woody again today to discuss his genetic testing results and was unable to reach him. He was previously contacted by the scheduling team to schedule a return visit to review results with me, but they were unable to reach him. I then attempted to reach Woody by phone (please see phone notes from 10/7, 10/16, and 10/26/2020). As I have also been unable to reach Woody to review his genetic testing results, he is being sent this letter. He is strongly encouraged to contact me after receiving this letter, so that we may discuss the results, follow-up recommendations, and implications to family members in more detail.    Woody was originally seen on 7/13 and 8/24/2020 and his blood was drawn on 9/3/2020. We ordered Specific Site Analysis of BRCA1 from [facility] because of his family history of cancer and familial BRCA1 mutation.     Genetic Testing Results: POSITIVE  Woody is POSITIVE for the familial BRCA1 mutation. Specifically his mutation is called [id number]. We discussed that this  "mutation is associated with a diagnosis of Hereditary Breast and Ovarian Cancer (HBOC) syndrome and increased risk for certain cancers.     Of note, this testing only looked for this one mutation in the BRCA1 gene.      A copy of the test report can be found in the Laboratory tab, dated 9/3/2020, and named \"SEND OUTS MISC TEST\". The report is scanned in as a linked document.    BRCA1 Cancer Risks:  Men with mutations in the BRCA1 gene have a:      16-20% lifetime prostate cancer risk, including a higher likelihood for advanced or metastatic disease. This is greater than the general population risk of approximately 11.6%.      6-8% lifetime male breast cancer risk. This is greater than the general population risk of less than 1%.      Women with mutations in the BRCA1 gene have a:    50-85% lifetime risk of developing breast cancer. This is much greater than the general population breast cancer risk of 12%    20-40% lifetime risk of developing ovarian cancer, though some studies suggest that this risk may be as high as 60%. This is much greater than the general population ovarian cancer risk of 1-2%.    Lifetime pancreatic cancer risk and melanoma risk in both males and females may be increased as well. Also, though no exact lifetime risks are available at this time, there may be increased risks for other cancers (i.e. uterine cancer).     Cancer Screening and Prevention:  The following screening is recommended for men who have a mutation in the BRCA1 gene, per current National Comprehensive Cancer Network (NCCN) guidelines:    Breast self-exam starting at age 35    Annual clinical breast exams starting at age 35    Consider annual mammogram screening in men with gynecomastia starting at age 50 or 10 years prior to the earliest male breast cancer in the family    Prostate cancer screening can be considered starting at age 40    The following screening is recommended for women who have a mutation in the BRCA1 " gene:    Breast awareness starting at age 18    Clinical breast exams starting at age 25; repeated every 6-12 months    Annual breast MRI from age 25-29    Annual mammograms with consideration of tomosynthesis and breast MRI alternating every 6 months starting at age 30    Consider transvaginal ultrasound and a  blood test starting at age 30-35, though the benefits of this screening are unclear.    Prophylactic mastectomy is a surgical option, which reduces breast cancer risk by 90%. Chemoprevention with Tamoxifen can reduce breast cancer risk in some women. Surgical risk reduction options and Tamoxifen use should be discussed with a woman's physician.      Given the limitations of screening for ovarian cancer, prophylactic removal of the ovaries and fallopian tubes is an option and is recommended after women are finished planning their families or between 35 and 40. As with any surgery, risks are involved, and this option should be discussed in greater detail with a gyn-oncologist.      Some data suggests that there may be an increased risk for serous uterine cancer in women with BRCA1 mutations. However, further research is needed in this area, and data is currently limited. Women with BRCA1 mutations are encouraged to discuss the risks and benefits of hysterectomy at the time of oophorectomy with their provider before surgery.    Using oral contraceptives for five years or more has been shown to reduce ovarian cancer risk by around 50%. The data are still inconclusive as to whether or not using oral contraceptives will increase breast cancer risk in BRCA mutation carriers.     Screening for pancreatic cancer is not offered on a routine basis, as the benefits of current screening methods are unknown. Pancreatic cancer screening may be considered based on family history, though. Screening for melanoma may also be considered. Of note, Woody does not currently have a close family history of either pancreatic  cancer or melanoma.  Some chemotherapies for certain cancers may be more effective in individuals with BRCA mutations. Woody should discuss this with his physicians, if chemotherapy is indicated for him in the future.     Other screening based on Woody's personal and family history:    Other population cancer screening options, such as those recommended by the American Cancer Society and NCCN, are also appropriate for Woody and his family.     These screening recommendations may change if there are changes to Woody's personal and/or family history of cancer. Final screening recommendations should be made by each individual's managing physician.    Woody could consider participating in our Cancer Risk Management Program in which our nurse practitioner provides an individual screening plan and assists with medical management. If Woody is interested in meeting with CLEVELAND Gomez for this service, I would be happy to place a referral. He could also consider calling the scheduling number [phone] to schedule this visit.    Of note, the above information is based on our current understanding of Woody's genetic findings. Woody is encouraged to reach out to me regularly and with any pertinent updates to his personal and/or family history of cancer, as our understanding of the genetic findings in his family may change over time.     Implications for Family Members:  Mutations in the BRCA1 gene are inherited in an autosomal dominant pattern. This means that each of Woody's children have a 50% chance of inheriting the same mutation. Likewise, each of his siblings has a 50% risk of having the same mutation. Other extended paternal relatives may also carry this mutation. I am happy to help his relatives connect with a genetic counselor in their area if they would like to discuss testing.    Also, in rare situations in which both parents have a mutation in the BRCA1 gene, their children may each have a  "25% risk for Fanconi anemia. Fanconi anemia is a rare condition with onset in childhood that often results in physical abnormalities, growth retardation, bone marrow failure, and increased risk for cancer. For individuals of childbearing age with BRCA1 mutations, genetic counseling and genetic testing may be advised for their partners.    Additional Testing Considerations:  Even though Woody's genetic testing result was positive, other relatives may carry a different gene mutation associated with hereditary cancer. Genetic counseling, with consideration of a multi-gene panel, is recommended for Woody's relatives. If any of these relatives do pursue genetic testing, Woody is encouraged to contact me so that we may review the impact of their test results on him.    Support Resources:  Woody will be mailed information regarding national and local support resources including [employer] and the American Cancer Society.    Plan:  1.  Woody will be mailed a copy of this letter, his test results, and support resources.  2.  I will provide a \"Dear Relative\" letter for Woody to share with his family members. This will also be mailed to Woody.  3.  He is encouraged to follow up with his medical providers, including Dr. Rossi.  4.  Woody is encouraged to contact me if he is interested in having a referral placed to meet with [facility] to discuss screening associated with a BRCA1 mutation.    Woody is strongly encouraged him to contact me directly at [phone], if he has any questions regarding this letter, his genetic testing results, and/or anything else we spoke about previously.     Maura Gonzales MS, [facility]  Licensed Genetic Counselor  Office: [phone]  Pager: [phone]  "

## 2021-03-29 DIAGNOSIS — I25.10 MULTIPLE VESSEL CORONARY ARTERY DISEASE: ICD-10-CM

## 2021-03-29 DIAGNOSIS — I25.5 ISCHEMIC CARDIOMYOPATHY: Primary | ICD-10-CM

## 2021-03-29 RX ORDER — LISINOPRIL 10 MG/1
TABLET ORAL
Qty: 90 TABLET | Refills: 0 | Status: SHIPPED | OUTPATIENT
Start: 2021-03-29 | End: 2021-05-17

## 2021-03-29 RX ORDER — LISINOPRIL 5 MG/1
TABLET ORAL
Qty: 90 TABLET | Refills: 0 | Status: SHIPPED | OUTPATIENT
Start: 2021-03-29 | End: 2021-05-17

## 2021-03-29 NOTE — TELEPHONE ENCOUNTER
[facility] sent Rx request for the following:      Requested Prescriptions   Pending Prescriptions Disp Refills   lisinopril (ZESTRIL) 5 MG tablet [Pharmacy Med Name: lisinopril 5 mg tablet] 90 tablet 3    Sig: Take 1 tablet (5 mg) by mouth daily Along with 10 mg tablet for 15 mg by mouth daily   Last Prescription Date:   4/7/20  Last Fill Qty/Refills:         90, R-3       lisinopril (ZESTRIL) 10 MG tablet [Pharmacy Med Name: lisinopril 10 mg tablet] 90 tablet 3    Sig: Take 1 tablet (10 mg) by mouth daily Along with 5 mg tablet for 15 mg by mouth daily   Last Prescription Date:   4/7/20  Last Fill Qty/Refills:         90, R-3      Last Office Visit:              6/30/20   Future Office visit:           None    Per LOV note, Pt was instructed to follow up 6 months later; around 12/30/20. Pt was in for labs in 9/20. Routing to PCP, for consideration of 90-day refill. Tammie Dejesus RN .............. 3/29/2021  2:16 PM

## 2021-05-13 PROBLEM — R73.9 ELEVATED RANDOM BLOOD GLUCOSE LEVEL: Status: ACTIVE | Noted: 2020-01-11

## 2021-05-13 PROBLEM — E78.5 HLD (HYPERLIPIDEMIA): Status: ACTIVE | Noted: 2020-01-11

## 2021-05-13 PROBLEM — J96.01 ACUTE RESPIRATORY FAILURE WITH HYPOXIA (H): Status: ACTIVE | Noted: 2020-01-17

## 2021-05-13 PROBLEM — L27.0 DRUG RASH: Status: ACTIVE | Noted: 2020-01-20

## 2021-05-13 PROBLEM — R06.83 SNORING: Status: ACTIVE | Noted: 2020-01-20

## 2021-05-17 ENCOUNTER — OFFICE VISIT (OUTPATIENT)
Dept: FAMILY MEDICINE | Facility: OTHER | Age: 51
End: 2021-05-17
Attending: FAMILY MEDICINE
Payer: COMMERCIAL

## 2021-05-17 VITALS
OXYGEN SATURATION: 98 % | BODY MASS INDEX: 30.06 KG/M2 | SYSTOLIC BLOOD PRESSURE: 110 MMHG | HEART RATE: 54 BPM | WEIGHT: 210 LBS | TEMPERATURE: 96.6 F | HEIGHT: 70 IN | DIASTOLIC BLOOD PRESSURE: 62 MMHG | RESPIRATION RATE: 20 BRPM

## 2021-05-17 DIAGNOSIS — I25.10 MULTIPLE VESSEL CORONARY ARTERY DISEASE: ICD-10-CM

## 2021-05-17 DIAGNOSIS — Z00.00 ANNUAL PHYSICAL EXAM: Primary | ICD-10-CM

## 2021-05-17 DIAGNOSIS — I25.5 ISCHEMIC CARDIOMYOPATHY: ICD-10-CM

## 2021-05-17 DIAGNOSIS — Z51.81 ENCOUNTER FOR THERAPEUTIC DRUG MONITORING: ICD-10-CM

## 2021-05-17 DIAGNOSIS — Z12.11 COLON CANCER SCREENING: ICD-10-CM

## 2021-05-17 PROBLEM — J96.01 ACUTE RESPIRATORY FAILURE WITH HYPOXIA (H): Status: RESOLVED | Noted: 2020-01-17 | Resolved: 2021-05-17

## 2021-05-17 PROCEDURE — 99396 PREV VISIT EST AGE 40-64: CPT | Performed by: FAMILY MEDICINE

## 2021-05-17 RX ORDER — LISINOPRIL 5 MG/1
5 TABLET ORAL DAILY
Qty: 90 TABLET | Refills: 3 | Status: SHIPPED | OUTPATIENT
Start: 2021-05-17 | End: 2022-06-15

## 2021-05-17 RX ORDER — LISINOPRIL 10 MG/1
10 TABLET ORAL DAILY
Qty: 90 TABLET | Refills: 3 | Status: SHIPPED | OUTPATIENT
Start: 2021-05-17 | End: 2022-06-02

## 2021-05-17 RX ORDER — METOPROLOL SUCCINATE 25 MG/1
25 TABLET, EXTENDED RELEASE ORAL DAILY
Qty: 90 TABLET | Refills: 3 | Status: SHIPPED | OUTPATIENT
Start: 2021-05-17 | End: 2022-06-15

## 2021-05-17 RX ORDER — ATORVASTATIN CALCIUM 80 MG/1
80 TABLET, FILM COATED ORAL DAILY
Qty: 90 TABLET | Refills: 3 | Status: SHIPPED | OUTPATIENT
Start: 2021-05-17 | End: 2022-06-15

## 2021-05-17 RX ORDER — SPIRONOLACTONE 25 MG/1
25 TABLET ORAL DAILY
Qty: 90 TABLET | Refills: 3 | Status: SHIPPED | OUTPATIENT
Start: 2021-05-17 | End: 2022-06-15

## 2021-05-17 ASSESSMENT — ANXIETY QUESTIONNAIRES
6. BECOMING EASILY ANNOYED OR IRRITABLE: NOT AT ALL
1. FEELING NERVOUS, ANXIOUS, OR ON EDGE: NOT AT ALL
IF YOU CHECKED OFF ANY PROBLEMS ON THIS QUESTIONNAIRE, HOW DIFFICULT HAVE THESE PROBLEMS MADE IT FOR YOU TO DO YOUR WORK, TAKE CARE OF THINGS AT HOME, OR GET ALONG WITH OTHER PEOPLE: NOT DIFFICULT AT ALL
3. WORRYING TOO MUCH ABOUT DIFFERENT THINGS: NOT AT ALL
GAD7 TOTAL SCORE: 0
2. NOT BEING ABLE TO STOP OR CONTROL WORRYING: NOT AT ALL
7. FEELING AFRAID AS IF SOMETHING AWFUL MIGHT HAPPEN: NOT AT ALL
5. BEING SO RESTLESS THAT IT IS HARD TO SIT STILL: NOT AT ALL

## 2021-05-17 ASSESSMENT — PATIENT HEALTH QUESTIONNAIRE - PHQ9
SUM OF ALL RESPONSES TO PHQ QUESTIONS 1-9: 0
5. POOR APPETITE OR OVEREATING: NOT AT ALL

## 2021-05-17 ASSESSMENT — PAIN SCALES - GENERAL: PAINLEVEL: NO PAIN (0)

## 2021-05-17 ASSESSMENT — MIFFLIN-ST. JEOR: SCORE: 1814.83

## 2021-05-17 NOTE — NURSING NOTE
"Patient presents to the clinic for yearly physical.      Chief Complaint   Patient presents with     Physical       Initial /62 (BP Location: Right arm, Patient Position: Sitting, Cuff Size: Adult Regular)   Pulse 54   Temp 96.6  F (35.9  C) (Temporal)   Resp 20   Ht 1.772 m (5' 9.75\")   Wt 95.3 kg (210 lb)   SpO2 98%   BMI 30.35 kg/m   Estimated body mass index is 30.35 kg/m  as calculated from the following:    Height as of this encounter: 1.772 m (5' 9.75\").    Weight as of this encounter: 95.3 kg (210 lb).  Medication Reconciliation: complete    Velvet Lipscomb LPN    "

## 2021-05-17 NOTE — LETTER
July 21, 2021      Woody Graves  [address] [address]        Dear ,    The Cologuard test is negative.  Plan to repeat colon cancer screening in 3 years.    Resulted Orders   COLOGUARD([employer])   Result Value Ref Range    COLOGUARD-ABSTRACT Negative        If you have any questions or concerns, please call the clinic at the number listed above.       Sincerely,    Omar Rossi MD  Electronically signed

## 2021-05-17 NOTE — PROGRESS NOTES
"3  SUBJECTIVE:   CC: Woody Graves is an 50 year old male who presents for preventive health visit.       Patient has been advised of split billing requirements and indicates understanding: Yes  Healthy Habits:    Do you get at least three servings of calcium containing foods daily (dairy, green leafy vegetables, etc.)? yes    Amount of exercise or daily activities, outside of work: walking 1 mile daily    Problems taking medications regularly No    Medication side effects: No    Have you had an eye exam in the past two years? no    Do you see a dentist twice per year? yes    Do you have sleep apnea, excessive snoring or daytime drowsiness?no    Stable with CAD.   Hospitalized at [facility] following a STEMI from 1/11/2020 to 1/21/2020. Found to have severe multi-vessel disease. CABG was planned, but then developed a recurrent STEMI and was brought urgently back to the Cath Lab.  Went into a V. fib cardiac arrest with ROSC after cardioversion and CPR. Had \"PTCA and FOUZIA x2 overlapping to the ostial to mid LAD. Unable to wire occluded D1 despite multiple attempts. S/p PTCA and FOUZIA x1 to the Cx/OM1, PTCA of mid Cx jailed by OM stent, FOUZIA x1 to the distal RCA and PTCA of RPBL with excellent angiographic results.\"  EF initially was 23% with improvement to 35 to 40% at time of discharge.  Last echocardiogram May 18, 2020 at [facility] showed improved EF, 55%    Tolerating current lisinopril, spironolactone, and metoprolol doses. Off furosemide for almost a year.    Had sinus pause on telemetry while sleep, FLORIAN suspected. Sleep study was borderline and intervention was felt not warranted.    Doing well. No CP or SOB. Active without problems. He is to take Brilinta for at least 2 years per Dr Esparza's note in April 2020.    Today's PHQ-2 Score:   PHQ-2 ( 1999 Pfizer) 6/30/2020 2/24/2020   Q1: Little interest or pleasure in doing things 0 0   Q2: Feeling down, depressed or hopeless 0 0   PHQ-2 Score 0 0       Abuse: Current " or Past(Physical, Sexual or Emotional)- NA  Do you feel safe in your environment? Yes    Have you ever done Advance Care Planning? (For example, a Health Directive, POLST, or a discussion with a medical provider or your loved ones about your wishes): No, advance care planning information given to patient to review.  Patient plans to discuss their wishes with loved ones or provider.      Social History     Tobacco Use     Smoking status: Former Smoker     Smokeless tobacco: Never Used     Tobacco comment: Quit smoking: cigars   Substance Use Topics     Alcohol use: Yes     Comment: couple of drinks on weekends     If you drink alcohol do you typically have >3 drinks per day or >7 drinks per week? No                      Last PSA: No results found for: PSA    Reviewed orders with patient. Reviewed health maintenance and updated orders accordingly - Yes  Patient Active Problem List   Diagnosis     Ischemic cardiomyopathy     Multiple vessel coronary artery disease     S/P drug eluting coronary stent placement     STEMI (ST elevation myocardial infarction) (H)     BRCA1 gene mutation positive     Snoring     HLD (hyperlipidemia)     Elevated random blood glucose level     Drug rash     Acute respiratory failure with hypoxia (H)     Past Surgical History:   Procedure Laterality Date     VASECTOMY         Social History     Tobacco Use     Smoking status: Former Smoker     Smokeless tobacco: Never Used     Tobacco comment: Quit smoking: cigars   Substance Use Topics     Alcohol use: Yes     Comment: couple of drinks on weekends     Family History   Problem Relation Age of Onset     Atrial fibrillation Father      Diabetes Father      Lung Cancer Father 62     Colon Cancer Mother 77        77 - cancer primary unknown     Alcoholism Brother      Schizophrenia Brother      Obesity Brother      Obesity Sister      Vascular Disease Sister      Diabetes Sister      Heart Disease Maternal Grandfather 41     Heart Disease  "Paternal Grandfather 65     Obesity Sister      Prostate Cancer No family hx of          Current Outpatient Medications   Medication Sig Dispense Refill     aspirin (ASA) 81 MG chewable tablet 81 mg       atorvastatin (LIPITOR) 80 MG tablet Take 1 tablet (80 mg) by mouth daily 90 tablet 3     Cholecalciferol (VITAMIN D) 125 MCG (5000 UT) CAPS        lisinopril (ZESTRIL) 10 MG tablet Take 1 tablet (10 mg) by mouth daily Along with 5 mg tablet for 15 mg by mouth daily 90 tablet 0     lisinopril (ZESTRIL) 5 MG tablet Take 1 tablet (5 mg) by mouth daily Along with 10 mg tablet for 15 mg by mouth daily 90 tablet 0     metoprolol succinate ER (TOPROL-XL) 25 MG 24 hr tablet Take 1 tablet (25 mg) by mouth daily 90 tablet 3     Multiple Vitamins-Minerals (MULTIVITAMIN ADULT PO)        spironolactone (ALDACTONE) 25 MG tablet Take 1 tablet (25 mg) by mouth daily 90 tablet 3     ticagrelor (BRILINTA) 90 MG tablet Take 1 tablet (90 mg) by mouth 2 times daily 180 tablet 3     No Known Allergies    Reviewed and updated as needed this visit by clinical staff  Tobacco  Allergies  Meds   Med Hx  Surg Hx  Fam Hx  Soc Hx        Reviewed and updated as needed this visit by Provider  Tobacco  Allergies  Meds  Problems  Med Hx  Surg Hx  Fam Hx             ROS:  General: Denies general constitutional problems  Eyes: Denies problems  Ears/Nose/Throat: Denies problems  Cardiovascular: Denies problems  Respiratory: Denies problems  Gastrointestinal: Denies problems  Genitourinary: Denies problems  Musculoskeletal: Denies problems  Skin: Denies problems  Neurologic: Denies problems  Psychiatric: Denies problems      OBJECTIVE:   /62 (BP Location: Right arm, Patient Position: Sitting, Cuff Size: Adult Regular)   Pulse 54   Temp 96.6  F (35.9  C) (Temporal)   Resp 20   Ht 1.772 m (5' 9.75\")   Wt 95.3 kg (210 lb)   SpO2 98%   BMI 30.35 kg/m    EXAM:  General Appearance: Pleasant, alert, appropriate appearance for age. No " acute distress  Eye Exam:  Normal external eye, conjunctiva, lids, cornea. JANAK.  Ear Exam: Normal TM's bilaterally. Normal auditory canals and external ears.  OroPharynx Exam:  Dental hygiene adequate. Normal buccal mucosa. Normal pharynx.  Neck Exam:  Supple, no masses or nodes. No carotid bruits.  Thyroid Exam: No nodules or enlargement.  Chest/Respiratory Exam: Normal chest wall and respirations. Clear to auscultation.  Cardiovascular Exam: Regular rate and rhythm. S1, S2, no murmur, click, gallop, or rubs.  Gastrointestinal Exam: Soft, non-tender, no masses or organomegaly.  Musculoskeletal Exam: Back is straight and non-tender, full ROM of upper and lower extremities.  Foot Exam: Left and right foot: good pedal pulses.  Skin: no rash or abnormalities  Neurologic Exam: Nonfocal, symmetric DTRs, normal gross motor, tone coordination and no tremor.  Psychiatric Exam: Alert and oriented - appropriate affect.          ASSESSMENT/PLAN:       ICD-10-CM    1. Annual physical exam  Z00.00    2. Ischemic cardiomyopathy  I25.5 atorvastatin (LIPITOR) 80 MG tablet     lisinopril (ZESTRIL) 10 MG tablet     lisinopril (ZESTRIL) 5 MG tablet     metoprolol succinate ER (TOPROL-XL) 25 MG 24 hr tablet     spironolactone (ALDACTONE) 25 MG tablet     ticagrelor (BRILINTA) 90 MG tablet     Echocardiogram Complete   3. Multiple vessel coronary artery disease  I25.10 lisinopril (ZESTRIL) 10 MG tablet     lisinopril (ZESTRIL) 5 MG tablet     ticagrelor (BRILINTA) 90 MG tablet     Echocardiogram Complete   4. Colon cancer screening  Z12.11 PRADIP(EX[employer]ENCES)   5. Encounter for therapeutic drug monitoring  Z51.81 Comprehensive metabolic panel     Hemoglobin A1c     CBC with platelets     Lipid Profile         Patient has been advised of split billing requirements and indicates understanding: Yes     Ordered routine labs for monitoring. He will obtain fasting in the near future.    Should take Brilinta until Jan 2022. Recommend  "follow up with cardiology to discuss stopping vs longer duration by Jan 2022.    Last echo in May 2020. Ordered echo to reassess for stability.    COUNSELING:  Reviewed preventive health counseling, as reflected in patient instructions       Regular exercise       Healthy diet/nutrition       Vision screening       Hearing screening       Immunizations    Declined: TDAP due to past reactions    Defer Pneumovax, risk does not seem high enough to warrant       Colon cancer screening - Cologuard ordered, best to avoid colonoscopy until Brilinta stopped.       Prostate cancer screening - start age 55    Estimated body mass index is 30.35 kg/m  as calculated from the following:    Height as of this encounter: 1.772 m (5' 9.75\").    Weight as of this encounter: 95.3 kg (210 lb).    Weight management plan: Discussed healthy diet and exercise guidelines    He reports that he has quit smoking. He has never used smokeless tobacco.      Counseling Resources:  ATP IV Guidelines  Pooled Cohorts Equation Calculator  FRAX Risk Assessment  ICSI Preventive Guidelines  Dietary Guidelines for Americans, 2010  USDA's MyPlate  ASA Prophylaxis  Lung CA Screening    Omar Rossi MD  [facility]  "

## 2021-05-17 NOTE — PATIENT INSTRUCTIONS
Cologuard screening   Continue Brilinta with plan to follow up with cardiology in Jan 2022  Fasting labs  Echocardiogram for routine recheck

## 2021-05-18 ASSESSMENT — ANXIETY QUESTIONNAIRES: GAD7 TOTAL SCORE: 0

## 2021-07-15 LAB — COLOGUARD-ABSTRACT: NEGATIVE

## 2021-08-23 ENCOUNTER — LAB (OUTPATIENT)
Dept: LAB | Facility: OTHER | Age: 51
End: 2021-08-23
Attending: FAMILY MEDICINE
Payer: COMMERCIAL

## 2021-08-23 ENCOUNTER — HOSPITAL ENCOUNTER (OUTPATIENT)
Dept: CARDIOLOGY | Facility: OTHER | Age: 51
End: 2021-08-23
Attending: FAMILY MEDICINE
Payer: COMMERCIAL

## 2021-08-23 DIAGNOSIS — I25.10 MULTIPLE VESSEL CORONARY ARTERY DISEASE: ICD-10-CM

## 2021-08-23 DIAGNOSIS — Z51.81 ENCOUNTER FOR THERAPEUTIC DRUG MONITORING: ICD-10-CM

## 2021-08-23 DIAGNOSIS — I25.5 ISCHEMIC CARDIOMYOPATHY: ICD-10-CM

## 2021-08-23 LAB
ALBUMIN SERPL-MCNC: 4.8 G/DL (ref 3.5–5.7)
ALP SERPL-CCNC: 68 U/L (ref 34–104)
ALT SERPL W P-5'-P-CCNC: 29 U/L (ref 7–52)
ANION GAP SERPL CALCULATED.3IONS-SCNC: 9 MMOL/L (ref 3–14)
AST SERPL W P-5'-P-CCNC: 24 U/L (ref 13–39)
BILIRUB SERPL-MCNC: 0.6 MG/DL (ref 0.3–1)
BUN SERPL-MCNC: 31 MG/DL (ref 7–25)
CALCIUM SERPL-MCNC: 10 MG/DL (ref 8.6–10.3)
CHLORIDE BLD-SCNC: 103 MMOL/L (ref 98–107)
CHOLEST SERPL-MCNC: 188 MG/DL
CO2 SERPL-SCNC: 25 MMOL/L (ref 21–31)
CREAT SERPL-MCNC: 1.28 MG/DL (ref 0.7–1.3)
ERYTHROCYTE [DISTWIDTH] IN BLOOD BY AUTOMATED COUNT: 12.7 % (ref 10–15)
FASTING STATUS PATIENT QL REPORTED: YES
GFR SERPL CREATININE-BSD FRML MDRD: 64 ML/MIN/1.73M2
GLUCOSE BLD-MCNC: 122 MG/DL (ref 70–105)
HBA1C MFR BLD: 5.7 % (ref 4–6.2)
HCT VFR BLD AUTO: 46 % (ref 40–53)
HDLC SERPL-MCNC: 65 MG/DL (ref 23–92)
HGB BLD-MCNC: 15.4 G/DL (ref 13.3–17.7)
LDLC SERPL CALC-MCNC: 104 MG/DL
LVEF ECHO: NORMAL
MCH RBC QN AUTO: 30.1 PG (ref 26.5–33)
MCHC RBC AUTO-ENTMCNC: 33.5 G/DL (ref 31.5–36.5)
MCV RBC AUTO: 90 FL (ref 78–100)
NONHDLC SERPL-MCNC: 123 MG/DL
PLATELET # BLD AUTO: 259 10E3/UL (ref 150–450)
POTASSIUM BLD-SCNC: 4.7 MMOL/L (ref 3.5–5.1)
PROT SERPL-MCNC: 7.1 G/DL (ref 6.4–8.9)
RBC # BLD AUTO: 5.11 10E6/UL (ref 4.4–5.9)
SODIUM SERPL-SCNC: 137 MMOL/L (ref 134–144)
TRIGL SERPL-MCNC: 97 MG/DL
WBC # BLD AUTO: 7.7 10E3/UL (ref 4–11)

## 2021-08-23 PROCEDURE — 85027 COMPLETE CBC AUTOMATED: CPT | Mod: ZL

## 2021-08-23 PROCEDURE — 83036 HEMOGLOBIN GLYCOSYLATED A1C: CPT | Mod: ZL

## 2021-08-23 PROCEDURE — 80053 COMPREHEN METABOLIC PANEL: CPT | Mod: ZL

## 2021-08-23 PROCEDURE — 93306 TTE W/DOPPLER COMPLETE: CPT | Mod: 26 | Performed by: INTERNAL MEDICINE

## 2021-08-23 PROCEDURE — 93306 TTE W/DOPPLER COMPLETE: CPT

## 2021-08-23 PROCEDURE — 80061 LIPID PANEL: CPT | Mod: ZL

## 2021-08-23 PROCEDURE — 36415 COLL VENOUS BLD VENIPUNCTURE: CPT | Mod: ZL

## 2021-08-24 ENCOUNTER — TELEPHONE (OUTPATIENT)
Dept: FAMILY MEDICINE | Facility: OTHER | Age: 51
End: 2021-08-24

## 2021-10-19 ENCOUNTER — IMMUNIZATION (OUTPATIENT)
Dept: FAMILY MEDICINE | Facility: OTHER | Age: 51
End: 2021-10-19
Attending: FAMILY MEDICINE

## 2021-10-19 DIAGNOSIS — Z23 NEED FOR PROPHYLACTIC VACCINATION AND INOCULATION AGAINST INFLUENZA: Primary | ICD-10-CM

## 2021-10-19 PROCEDURE — 90471 IMMUNIZATION ADMIN: CPT

## 2021-10-19 PROCEDURE — 91300 PR COVID VAC PFIZER DIL RECON 30 MCG/0.3 ML IM: CPT

## 2021-10-19 PROCEDURE — 90686 IIV4 VACC NO PRSV 0.5 ML IM: CPT

## 2021-10-19 PROCEDURE — 0004A PR COVID VAC PFIZER DIL RECON 30 MCG/0.3 ML IM: CPT

## 2021-10-19 NOTE — ADDENDUM NOTE
Addended by: KENNETH KNIGHT on: 10/19/2021 12:02 PM     Modules accepted: Orders, Level of Service, SmartSet

## 2021-12-14 ENCOUNTER — TELEPHONE (OUTPATIENT)
Dept: FAMILY MEDICINE | Facility: OTHER | Age: 51
End: 2021-12-14
Payer: COMMERCIAL

## 2021-12-14 DIAGNOSIS — I25.5 ISCHEMIC CARDIOMYOPATHY: ICD-10-CM

## 2021-12-14 DIAGNOSIS — I21.3 ST ELEVATION MYOCARDIAL INFARCTION (STEMI), UNSPECIFIED ARTERY (H): ICD-10-CM

## 2021-12-14 DIAGNOSIS — I25.10 MULTIPLE VESSEL CORONARY ARTERY DISEASE: ICD-10-CM

## 2021-12-14 DIAGNOSIS — R00.1 BRADYCARDIA, UNSPECIFIED: ICD-10-CM

## 2021-12-14 DIAGNOSIS — Z95.5 S/P DRUG ELUTING CORONARY STENT PLACEMENT: Primary | ICD-10-CM

## 2021-12-14 NOTE — TELEPHONE ENCOUNTER
"Discharge summary says: \"He will need to be on Brilinta therapy for no less than two years.\"  No further comment from last cardiology appointment with Dr Esparza on duration.  He required a lot of stenting interventions. It would be best to see if cardiology feels he should stop or continue. It is probably somewhat up to him. There is benefit to continuing but risk of bleeding. Or stop and have increased risk for stent blocking off.   Would he see cardiology at [facility] or [facility]?    He should have refills to continue for now.  "

## 2021-12-14 NOTE — TELEPHONE ENCOUNTER
Patient has been on Brilinta for 2 years and is wondering if he needs to continue it.  When he was put on it, that doctor informed his that he needed for be on it for only 2 years.        Velvet Lipscomb LPN 12/14/2021 10:15 AM

## 2021-12-14 NOTE — TELEPHONE ENCOUNTER
He needs a prescription refill, will he still need to take his Brilinta, its been 2 years, please call to advise, thank you!    Neva Ann on 12/14/2021 at 8:31 AM

## 2021-12-15 NOTE — TELEPHONE ENCOUNTER
Patient notified of providers note.  Patient prefers [facility], referral pending.      Velvet Lipscomb LPN 12/15/2021 8:08 AM

## 2022-01-27 ENCOUNTER — TRANSFERRED RECORDS (OUTPATIENT)
Dept: HEALTH INFORMATION MANAGEMENT | Facility: OTHER | Age: 52
End: 2022-01-27
Payer: COMMERCIAL

## 2022-03-31 NOTE — PATIENT INSTRUCTIONS
Reschedule sleep consult after ECHO  Reduce furosemide to 20 mg daily  Check fasting labs in 3 weeks  Follow-up in June  
118

## 2022-06-02 DIAGNOSIS — I25.5 ISCHEMIC CARDIOMYOPATHY: ICD-10-CM

## 2022-06-02 DIAGNOSIS — I25.10 MULTIPLE VESSEL CORONARY ARTERY DISEASE: ICD-10-CM

## 2022-06-02 RX ORDER — LISINOPRIL 10 MG/1
TABLET ORAL
Qty: 90 TABLET | Refills: 0 | Status: SHIPPED | OUTPATIENT
Start: 2022-06-02 | End: 2022-07-26

## 2022-06-02 NOTE — LETTER
June 2, 2022      Woody Graves  [address] [address]        Dear Woody,     This letter is to remind you that you are due for your annual exam with Omar Rossi. Your last comprehensive visit was more than 12 months ago.    Please call the clinic at [phone] to schedule your appointment.    Thank you for choosing [facility] and [facility] for your health care needs.    Sincerely,    Refill SVETA  [facility]

## 2022-06-02 NOTE — TELEPHONE ENCOUNTER
[facility] sent Rx request for the following:      Requested Prescriptions   Pending Prescriptions Disp Refills     lisinopril (ZESTRIL) 10 MG tablet [Pharmacy Med Name: lisinopril 10 mg tablet] 90 tablet 3     Sig: TAKE 1 TABLET BY MOUTH DAILY       ACE Inhibitors (Including Combos) Protocol Failed - 6/2/2022 12:55 PM        Failed - Blood pressure under 140/90 in past 12 months     BP Readings from Last 3 Encounters:   05/17/21 110/62   06/30/20 110/78   02/24/20 100/72                   Last Prescription Date:   5/17/2021  Last Fill Qty/Refills:         90, R-3    Last Office Visit:              5/17/2021   Future Office visit:           NONE    Unable to complete prescription refill per RN Medication Refill Policy.     Annual visit reminder letter sent.     Keira Thornton RN on 6/2/2022 at 12:57 PM

## 2022-06-14 DIAGNOSIS — I25.10 MULTIPLE VESSEL CORONARY ARTERY DISEASE: ICD-10-CM

## 2022-06-14 DIAGNOSIS — I25.5 ISCHEMIC CARDIOMYOPATHY: ICD-10-CM

## 2022-06-15 RX ORDER — METOPROLOL SUCCINATE 25 MG/1
TABLET, EXTENDED RELEASE ORAL
Qty: 90 TABLET | Refills: 0 | Status: SHIPPED | OUTPATIENT
Start: 2022-06-15 | End: 2022-07-26

## 2022-06-15 RX ORDER — ATORVASTATIN CALCIUM 80 MG/1
TABLET, FILM COATED ORAL
Qty: 90 TABLET | Refills: 0 | Status: SHIPPED | OUTPATIENT
Start: 2022-06-15 | End: 2022-07-26

## 2022-06-15 RX ORDER — LISINOPRIL 5 MG/1
TABLET ORAL
Qty: 90 TABLET | Refills: 0 | Status: SHIPPED | OUTPATIENT
Start: 2022-06-15 | End: 2022-07-26

## 2022-06-15 RX ORDER — SPIRONOLACTONE 25 MG/1
TABLET ORAL
Qty: 90 TABLET | Refills: 0 | Status: SHIPPED | OUTPATIENT
Start: 2022-06-15 | End: 2022-07-26

## 2022-06-15 NOTE — TELEPHONE ENCOUNTER
"[facility] sent Rx request for the following:      Requested Prescriptions   Pending Prescriptions Disp Refills     spironolactone (ALDACTONE) 25 MG tablet [Pharmacy Med Name: spironolactone 25 mg tablet] 90 tablet 3     Sig: TAKE 1 TABLET BY MOUTH DAILY       Diuretics (Including Combos) Protocol Failed - 6/14/2022  9:31 AM        Failed - Blood pressure under 140/90 in past 12 months     BP Readings from Last 3 Encounters:   05/17/21 110/62   06/30/20 110/78   02/24/20 100/72           Passed - Recent (12 mo) or future (30 days) visit within the authorizing provider's specialty     Patient has had an office visit with the authorizing provider or a provider within the authorizing providers department within the previous 12 mos or has a future within next 30 days. See \"Patient Info\" tab in inbasket, or \"Choose Columns\" in Meds & Orders section of the refill encounter.            Passed - Medication is active on med list        Passed - Patient is age 18 or older        Passed - Normal serum creatinine on file in past 12 months     Recent Labs   Lab Test 08/23/21  0759   CR 1.28            Passed - Normal serum potassium on file in past 12 months     Recent Labs   Lab Test 08/23/21  0759   POTASSIUM 4.7            Passed - Normal serum sodium on file in past 12 months     Recent Labs   Lab Test 08/23/21  0759            Last Prescription Date:   05/17/21  Last Fill Qty/Refills:         90, R-3          metoprolol succinate ER (TOPROL XL) 25 MG 24 hr tablet [Pharmacy Med Name: metoprolol succinate ER 25 mg tablet,extended release 24 hr] 90 tablet 3     Sig: TAKE 1 TABLET BY MOUTH DAILY       Beta-Blockers Protocol Failed - 6/14/2022  9:31 AM        Failed - Blood pressure under 140/90 in past 12 months     BP Readings from Last 3 Encounters:   05/17/21 110/62   06/30/20 110/78   02/24/20 100/72           Passed - Patient is age 6 or older        Passed - Recent (12 mo) or future (30 days) visit within " "the authorizing provider's specialty     Patient has had an office visit with the authorizing provider or a provider within the authorizing providers department within the previous 12 mos or has a future within next 30 days. See \"Patient Info\" tab in inbasket, or \"Choose Columns\" in Meds & Orders section of the refill encounter.          Passed - Medication is active on med list     Last Prescription Date:   05/17/21  Last Fill Qty/Refills:         90, R-3          atorvastatin (LIPITOR) 80 MG tablet [Pharmacy Med Name: atorvastatin 80 mg tablet] 90 tablet 3     Sig: TAKE 1 TABLET BY MOUTH DAILY       Statins Protocol Passed - 6/14/2022  9:31 AM        Passed - LDL on file in past 12 months     Recent Labs   Lab Test 08/23/21  0759   *           Passed - No abnormal creatine kinase in past 12 months     No lab results found.         Passed - Recent (12 mo) or future (30 days) visit within the authorizing provider's specialty     Patient has had an office visit with the authorizing provider or a provider within the authorizing providers department within the previous 12 mos or has a future within next 30 days. See \"Patient Info\" tab in [employer], or \"Choose Columns\" in Meds & Orders section of the refill encounter.            Passed - Medication is active on med list        Passed - Patient is age 18 or older     Last Prescription Date:   05/17/21  Last Fill Qty/Refills:         90, R-3          lisinopril (ZESTRIL) 5 MG tablet [Pharmacy Med Name: lisinopril 5 mg tablet] 90 tablet 3     Sig: TAKE 1 TABLET BY MOUTH DAILY       ACE Inhibitors (Including Combos) Protocol Failed - 6/14/2022  9:31 AM        Failed - Blood pressure under 140/90 in past 12 months     BP Readings from Last 3 Encounters:   05/17/21 110/62   06/30/20 110/78   02/24/20 100/72           Passed - Recent (12 mo) or future (30 days) visit within the authorizing provider's specialty     Patient has had an office visit with the authorizing " "provider or a provider within the authorizing providers department within the previous 12 mos or has a future within next 30 days. See \"Patient Info\" tab in inbasket, or \"Choose Columns\" in Meds & Orders section of the refill encounter.          Passed - Medication is active on med list        Passed - Patient is age 18 or older        Passed - Normal serum creatinine on file in past 12 months     Recent Labs   Lab Test 08/23/21  0759   CR 1.28     Ok to refill medication if creatinine is low          Passed - Normal serum potassium on file in past 12 months     Recent Labs   Lab Test 08/23/21  0759 01/29/20  0910 01/20/20  0550   POTASSIUM 4.7   < >  --    52256  --   --  4.1    < > = values in this interval not displayed.        Last Prescription Date:   06/02/22  Last Fill Qty/Refills:         90, R-0  Last Office Visit:              05/17/21  Future Office visit:           None    Laureen Suarez RN .............. 6/15/2022  11:34 AM       "

## 2022-06-16 DIAGNOSIS — I25.10 MULTIPLE VESSEL CORONARY ARTERY DISEASE: ICD-10-CM

## 2022-06-16 DIAGNOSIS — I25.5 ISCHEMIC CARDIOMYOPATHY: ICD-10-CM

## 2022-06-21 NOTE — TELEPHONE ENCOUNTER
LMTCB x 2 to schedule annual physical for further refills.    Soraya Noguera on 6/21/2022 at 10:56 AM

## 2022-06-22 ENCOUNTER — TELEPHONE (OUTPATIENT)
Dept: FAMILY MEDICINE | Facility: OTHER | Age: 52
End: 2022-06-22

## 2022-06-22 DIAGNOSIS — Z51.81 ENCOUNTER FOR THERAPEUTIC DRUG MONITORING: Primary | ICD-10-CM

## 2022-06-22 NOTE — TELEPHONE ENCOUNTER
Last labs done 8/23/21 cmp, cbc, a1c, lipid. All labs are pending with dx used last time.   Nell Barragan LPN .............6/22/2022     3:07 PM

## 2022-06-22 NOTE — TELEPHONE ENCOUNTER
Patient is looking to get lab orders placed to come in and get fasting labs drawn prior to his 7/26/22 physical visit.     Please contact patient when orders are in.     Farida Sanchez on 6/22/2022 at 2:23 PM

## 2022-06-29 ENCOUNTER — LAB (OUTPATIENT)
Dept: LAB | Facility: OTHER | Age: 52
End: 2022-06-29
Payer: COMMERCIAL

## 2022-06-29 DIAGNOSIS — Z51.81 ENCOUNTER FOR THERAPEUTIC DRUG MONITORING: ICD-10-CM

## 2022-06-29 LAB
ALBUMIN SERPL-MCNC: 4.8 G/DL (ref 3.5–5.7)
ALP SERPL-CCNC: 79 U/L (ref 34–104)
ALT SERPL W P-5'-P-CCNC: 27 U/L (ref 7–52)
ANION GAP SERPL CALCULATED.3IONS-SCNC: 7 MMOL/L (ref 3–14)
AST SERPL W P-5'-P-CCNC: 25 U/L (ref 13–39)
BASOPHILS # BLD AUTO: 0.1 10E3/UL (ref 0–0.2)
BASOPHILS NFR BLD AUTO: 1 %
BILIRUB SERPL-MCNC: 0.6 MG/DL (ref 0.3–1)
BUN SERPL-MCNC: 27 MG/DL (ref 7–25)
CALCIUM SERPL-MCNC: 9.5 MG/DL (ref 8.6–10.3)
CHLORIDE BLD-SCNC: 103 MMOL/L (ref 98–107)
CHOLEST SERPL-MCNC: 177 MG/DL
CO2 SERPL-SCNC: 26 MMOL/L (ref 21–31)
CREAT SERPL-MCNC: 1.24 MG/DL (ref 0.7–1.3)
EOSINOPHIL # BLD AUTO: 0.2 10E3/UL (ref 0–0.7)
EOSINOPHIL NFR BLD AUTO: 3 %
ERYTHROCYTE [DISTWIDTH] IN BLOOD BY AUTOMATED COUNT: 13.2 % (ref 10–15)
FASTING STATUS PATIENT QL REPORTED: NORMAL
GFR SERPL CREATININE-BSD FRML MDRD: 70 ML/MIN/1.73M2
GLUCOSE BLD-MCNC: 118 MG/DL (ref 70–105)
HBA1C MFR BLD: 5.6 % (ref 4–6.2)
HCT VFR BLD AUTO: 46.2 % (ref 40–53)
HDLC SERPL-MCNC: 68 MG/DL (ref 23–92)
HGB BLD-MCNC: 15.4 G/DL (ref 13.3–17.7)
IMM GRANULOCYTES # BLD: 0 10E3/UL
IMM GRANULOCYTES NFR BLD: 0 %
LDLC SERPL CALC-MCNC: 92 MG/DL
LYMPHOCYTES # BLD AUTO: 1.6 10E3/UL (ref 0.8–5.3)
LYMPHOCYTES NFR BLD AUTO: 23 %
MCH RBC QN AUTO: 30.2 PG (ref 26.5–33)
MCHC RBC AUTO-ENTMCNC: 33.3 G/DL (ref 31.5–36.5)
MCV RBC AUTO: 91 FL (ref 78–100)
MONOCYTES # BLD AUTO: 0.9 10E3/UL (ref 0–1.3)
MONOCYTES NFR BLD AUTO: 12 %
NEUTROPHILS # BLD AUTO: 4.2 10E3/UL (ref 1.6–8.3)
NEUTROPHILS NFR BLD AUTO: 61 %
NONHDLC SERPL-MCNC: 109 MG/DL
NRBC # BLD AUTO: 0 10E3/UL
NRBC BLD AUTO-RTO: 0 /100
PLATELET # BLD AUTO: 244 10E3/UL (ref 150–450)
POTASSIUM BLD-SCNC: 4.4 MMOL/L (ref 3.5–5.1)
PROT SERPL-MCNC: 6.8 G/DL (ref 6.4–8.9)
RBC # BLD AUTO: 5.1 10E6/UL (ref 4.4–5.9)
SODIUM SERPL-SCNC: 136 MMOL/L (ref 134–144)
TRIGL SERPL-MCNC: 87 MG/DL
WBC # BLD AUTO: 6.9 10E3/UL (ref 4–11)

## 2022-06-29 PROCEDURE — 80053 COMPREHEN METABOLIC PANEL: CPT | Mod: ZL

## 2022-06-29 PROCEDURE — 36415 COLL VENOUS BLD VENIPUNCTURE: CPT | Mod: ZL

## 2022-06-29 PROCEDURE — 80061 LIPID PANEL: CPT | Mod: ZL

## 2022-06-29 PROCEDURE — 85025 COMPLETE CBC W/AUTO DIFF WBC: CPT | Mod: ZL

## 2022-06-29 PROCEDURE — 83036 HEMOGLOBIN GLYCOSYLATED A1C: CPT | Mod: ZL

## 2022-07-18 RX ORDER — SPIRONOLACTONE 25 MG/1
25 TABLET ORAL DAILY
Qty: 90 TABLET | Status: CANCELLED | OUTPATIENT
Start: 2022-07-18

## 2022-07-18 RX ORDER — ATORVASTATIN CALCIUM 80 MG/1
80 TABLET, FILM COATED ORAL DAILY
Qty: 90 TABLET | Status: CANCELLED | OUTPATIENT
Start: 2022-07-18

## 2022-07-18 RX ORDER — LISINOPRIL 10 MG/1
10 TABLET ORAL DAILY
Qty: 90 TABLET | Status: CANCELLED | OUTPATIENT
Start: 2022-07-18

## 2022-07-18 RX ORDER — METOPROLOL SUCCINATE 25 MG/1
25 TABLET, EXTENDED RELEASE ORAL DAILY
Qty: 90 TABLET | Status: CANCELLED | OUTPATIENT
Start: 2022-07-18

## 2022-07-18 RX ORDER — LISINOPRIL 5 MG/1
5 TABLET ORAL DAILY
Qty: 90 TABLET | Status: CANCELLED | OUTPATIENT
Start: 2022-07-18

## 2022-07-26 ENCOUNTER — OFFICE VISIT (OUTPATIENT)
Dept: FAMILY MEDICINE | Facility: OTHER | Age: 52
End: 2022-07-26
Attending: FAMILY MEDICINE
Payer: COMMERCIAL

## 2022-07-26 VITALS
DIASTOLIC BLOOD PRESSURE: 82 MMHG | HEIGHT: 70 IN | RESPIRATION RATE: 20 BRPM | TEMPERATURE: 97.9 F | SYSTOLIC BLOOD PRESSURE: 122 MMHG | WEIGHT: 211.25 LBS | BODY MASS INDEX: 30.24 KG/M2 | HEART RATE: 54 BPM | OXYGEN SATURATION: 99 %

## 2022-07-26 DIAGNOSIS — I25.5 ISCHEMIC CARDIOMYOPATHY: ICD-10-CM

## 2022-07-26 DIAGNOSIS — Z11.59 NEED FOR HEPATITIS C SCREENING TEST: ICD-10-CM

## 2022-07-26 DIAGNOSIS — Z00.00 ANNUAL PHYSICAL EXAM: Primary | ICD-10-CM

## 2022-07-26 DIAGNOSIS — I25.10 MULTIPLE VESSEL CORONARY ARTERY DISEASE: ICD-10-CM

## 2022-07-26 PROCEDURE — 99396 PREV VISIT EST AGE 40-64: CPT | Performed by: FAMILY MEDICINE

## 2022-07-26 PROCEDURE — 99212 OFFICE O/P EST SF 10 MIN: CPT | Mod: 25 | Performed by: FAMILY MEDICINE

## 2022-07-26 RX ORDER — ATORVASTATIN CALCIUM 80 MG/1
80 TABLET, FILM COATED ORAL DAILY
Qty: 90 TABLET | Refills: 4 | Status: SHIPPED | OUTPATIENT
Start: 2022-07-26 | End: 2023-09-13

## 2022-07-26 RX ORDER — METOPROLOL SUCCINATE 25 MG/1
25 TABLET, EXTENDED RELEASE ORAL DAILY
Qty: 90 TABLET | Refills: 4 | Status: SHIPPED | OUTPATIENT
Start: 2022-07-26 | End: 2023-09-13

## 2022-07-26 RX ORDER — LISINOPRIL 5 MG/1
5 TABLET ORAL DAILY
Qty: 90 TABLET | Refills: 4 | Status: SHIPPED | OUTPATIENT
Start: 2022-07-26 | End: 2023-09-13

## 2022-07-26 RX ORDER — EZETIMIBE 10 MG/1
10 TABLET ORAL DAILY
Qty: 90 TABLET | Refills: 4 | Status: SHIPPED | OUTPATIENT
Start: 2022-07-26 | End: 2023-09-08

## 2022-07-26 RX ORDER — SPIRONOLACTONE 25 MG/1
25 TABLET ORAL DAILY
Qty: 90 TABLET | Refills: 4 | Status: CANCELLED | OUTPATIENT
Start: 2022-07-26

## 2022-07-26 RX ORDER — LISINOPRIL 10 MG/1
10 TABLET ORAL DAILY
Qty: 90 TABLET | Refills: 4 | Status: SHIPPED | OUTPATIENT
Start: 2022-07-26 | End: 2023-09-13

## 2022-07-26 ASSESSMENT — ENCOUNTER SYMPTOMS
NAUSEA: 0
JOINT SWELLING: 0
PARESTHESIAS: 0
HEMATOCHEZIA: 0
DYSURIA: 0
FREQUENCY: 0
FEVER: 0
CONSTIPATION: 0
NERVOUS/ANXIOUS: 0
HEADACHES: 0
DIARRHEA: 0
EYE PAIN: 0
CHILLS: 0
WEAKNESS: 0
COUGH: 0
MYALGIAS: 0
ARTHRALGIAS: 0
HEARTBURN: 0
DIZZINESS: 0
SORE THROAT: 0
ABDOMINAL PAIN: 0
HEMATURIA: 0
SHORTNESS OF BREATH: 0
PALPITATIONS: 0

## 2022-07-26 ASSESSMENT — PAIN SCALES - GENERAL: PAINLEVEL: NO PAIN (0)

## 2022-07-26 NOTE — NURSING NOTE
"Patient presents to the clinic for physical.    FOOD SECURITY SCREENING QUESTIONS:    The next two questions are to help us understand your food security.  If you are feeling you need any assistance in this area, we have resources available to support you today.    Hunger Vital Signs:  Within the past 12 months we worried whether our food would run out before we got money to buy more. Never  Within the past 12 months the food we bought just didn't last and we didn't have money to get more. Never    Advance Care Directive on file? no  Advance Care Directive provided to patient? Yes.    Chief Complaint   Patient presents with     Physical       Initial /82 (BP Location: Right arm, Patient Position: Sitting, Cuff Size: Adult Large)   Pulse 54   Temp 97.9  F (36.6  C) (Tympanic)   Resp 20   Ht 1.772 m (5' 9.75\")   Wt 95.8 kg (211 lb 4 oz)   SpO2 99%   BMI 30.53 kg/m   Estimated body mass index is 30.53 kg/m  as calculated from the following:    Height as of this encounter: 1.772 m (5' 9.75\").    Weight as of this encounter: 95.8 kg (211 lb 4 oz).  Medication Reconciliation: complete        Velvet Lipscomb LPN       "

## 2022-07-26 NOTE — PROGRESS NOTES
3  SUBJECTIVE:   CC: Woody Graves is an 52 year old male who presents for preventive health visit.       Patient has been advised of split billing requirements and indicates understanding: Yes  Healthy Habits:      Answers for HPI/ROS submitted by the patient on 7/26/2022  Frequency of exercise:: 2-3 days/week  Getting at least 3 servings of Calcium per day:: Yes  Diet:: Low fat/cholesterol  Taking medications regularly:: Yes  Medication side effects:: None  Bi-annual eye exam:: NO  Dental care twice a year:: NO  Sleep apnea or symptoms of sleep apnea:: None  abdominal pain: No  Blood in stool: No  Blood in urine: No  chest pain: No  chills: No  congestion: No  constipation: No  cough: No  diarrhea: No  dizziness: No  ear pain: No  eye pain: No  nervous/anxious: No  fever: No  frequency: No  genital sores: No  headaches: No  hearing loss: No  heartburn: No  arthralgias: No  joint swelling: No  peripheral edema: No  mood changes: No  myalgias: No  nausea: No  dysuria: No  palpitations: No  Skin sensation changes: No  sore throat: No  urgency: No  rash: No  shortness of breath: No  visual disturbance: No  weakness: No  impotence: No  penile discharge: No  Additional concerns today:: No  Duration of exercise:: 15-30 minutes        Today's PHQ-2 Score:   PHQ-2 ( 1999 Pfizer) 7/26/2022 7/26/2022   Q1: Little interest or pleasure in doing things 0 -   Q2: Feeling down, depressed or hopeless 0 -   PHQ-2 Score 0 -   PHQ-2 Total Score (12-17 Years)- Positive if 3 or more points; Administer PHQ-A if positive - -   Q1: Little interest or pleasure in doing things Not at all Not at all   Q2: Feeling down, depressed or hopeless Not at all Not at all   PHQ-2 Score 0 0       Abuse: Current or Past(Physical, Sexual or Emotional)- NA  Do you feel safe in your environment? Yes        Social History     Tobacco Use     Smoking status: Former Smoker     Smokeless tobacco: Never Used     Tobacco comment: Quit smoking: cigars    Substance Use Topics     Alcohol use: Yes     Comment: most weekends-no weekdays     If you drink alcohol do you typically have >3 drinks per day or >7 drinks per week? No                      Last PSA: No results found for: PSA     STEMI in 2020. Followed up with cardiology at [facility] in 2022. Brilinta extended for 6 months per note. He is still taking.  Spironolactone discontinued. Given ezetimibe for 3 months, but stopped and no repeat lipids on the ezetimibe.    Reviewed orders with patient. Reviewed health maintenance and updated orders accordingly - Yes  Patient Active Problem List   Diagnosis     Ischemic cardiomyopathy     Multiple vessel coronary artery disease     S/P drug eluting coronary stent placement     STEMI (ST elevation myocardial infarction) (H)     BRCA1 gene mutation positive     Snoring     HLD (hyperlipidemia)     Elevated random blood glucose level     Drug rash     Past Surgical History:   Procedure Laterality Date     VASECTOMY         Social History     Tobacco Use     Smoking status: Former Smoker     Quit date:      Years since quittin.5     Smokeless tobacco: Never Used     Tobacco comment: Quit smoking: cigars   Substance Use Topics     Alcohol use: Yes     Comment: most weekends-no weekdays     Family History   Problem Relation Age of Onset     Atrial fibrillation Father      Diabetes Father      Lung Cancer Father 62     Colon Cancer Mother 77        77 - cancer primary unknown     Alcoholism Brother      Schizophrenia Brother      Obesity Brother      Obesity Sister      Vascular Disease Sister      Diabetes Sister      Heart Disease Maternal Grandfather 41     Heart Disease Paternal Grandfather 65     Obesity Sister      Prostate Cancer No family hx of          Current Outpatient Medications   Medication Sig Dispense Refill     atorvastatin (LIPITOR) 80 MG tablet Take 1 tablet (80 mg) by mouth daily 90 tablet 4     ezetimibe (ZETIA) 10 MG tablet Take 1  "tablet (10 mg) by mouth daily 90 tablet 4     lisinopril (ZESTRIL) 10 MG tablet Take 1 tablet (10 mg) by mouth daily 90 tablet 4     lisinopril (ZESTRIL) 5 MG tablet Take 1 tablet (5 mg) by mouth daily 90 tablet 4     metoprolol succinate ER (TOPROL XL) 25 MG 24 hr tablet Take 1 tablet (25 mg) by mouth daily 90 tablet 4     aspirin (ASA) 81 MG chewable tablet 81 mg       Cholecalciferol (VITAMIN D) 125 MCG (5000 UT) CAPS        Multiple Vitamins-Minerals (MULTIVITAMIN ADULT PO)        No Known Allergies    Reviewed and updated as needed this visit by clinical staff   Tobacco  Allergies  Meds      Soc Hx          Reviewed and updated as needed this visit by Provider   Tobacco  Allergies  Meds  Problems  Med Hx  Surg Hx  Fam Hx             ROS:  As above    OBJECTIVE:   /82 (BP Location: Right arm, Patient Position: Sitting, Cuff Size: Adult Large)   Pulse 54   Temp 97.9  F (36.6  C) (Tympanic)   Resp 20   Ht 1.772 m (5' 9.75\")   Wt 95.8 kg (211 lb 4 oz)   SpO2 99%   BMI 30.53 kg/m    EXAM:  General Appearance: Pleasant, alert, appropriate appearance for age. No acute distress  Eye Exam:  Normal external eye, conjunctiva, lids, cornea. JANAK.  Ear Exam: Normal TM's bilaterally. Normal auditory canals and external ears.  OroPharynx Exam:  Dental hygiene adequate. Normal buccal mucosa. Normal pharynx.  Neck Exam:  Supple, no masses or nodes.  Thyroid Exam: No nodules or enlargement.  Chest/Respiratory Exam: Normal chest wall and respirations. Clear to auscultation.  Cardiovascular Exam: Regular rate and rhythm. S1, S2, no murmur, click, gallop, or rubs.  Gastrointestinal Exam: Soft, non-tender, no masses or organomegaly.  Musculoskeletal Exam: Back is straight and non-tender, full ROM of upper and lower extremities.  Foot Exam: Left and right foot: good pedal pulses.  Skin: no rash or abnormalities  Neurologic Exam: Nonfocal, symmetric DTRs, normal gross motor, tone coordination and no " tremor.  Psychiatric Exam: Alert and oriented - appropriate affect.      Recent Results (from the past 720 hour(s))   Lipid Panel    Collection Time: 06/29/22  7:24 AM   Result Value Ref Range    Cholesterol 177 <200 mg/dL    Triglycerides 87 <150 mg/dL    Direct Measure HDL 68 23 - 92 mg/dL    LDL Cholesterol Calculated 92 <=100 mg/dL    Non HDL Cholesterol 109 <130 mg/dL    Patient Fasting > 8hrs? Unknown    Hemoglobin A1c    Collection Time: 06/29/22  7:24 AM   Result Value Ref Range    Hemoglobin A1C 5.6 4.0 - 6.2 %   Comprehensive Metabolic Panel    Collection Time: 06/29/22  7:24 AM   Result Value Ref Range    Sodium 136 134 - 144 mmol/L    Potassium 4.4 3.5 - 5.1 mmol/L    Chloride 103 98 - 107 mmol/L    Carbon Dioxide (CO2) 26 21 - 31 mmol/L    Anion Gap 7 3 - 14 mmol/L    Urea Nitrogen 27 (H) 7 - 25 mg/dL    Creatinine 1.24 0.70 - 1.30 mg/dL    Calcium 9.5 8.6 - 10.3 mg/dL    Glucose 118 (H) 70 - 105 mg/dL    Alkaline Phosphatase 79 34 - 104 U/L    AST 25 13 - 39 U/L    ALT 27 7 - 52 U/L    Protein Total 6.8 6.4 - 8.9 g/dL    Albumin 4.8 3.5 - 5.7 g/dL    Bilirubin Total 0.6 0.3 - 1.0 mg/dL    GFR Estimate 70 >60 mL/min/1.73m2   CBC with platelets and differential    Collection Time: 06/29/22  7:24 AM   Result Value Ref Range    WBC Count 6.9 4.0 - 11.0 10e3/uL    RBC Count 5.10 4.40 - 5.90 10e6/uL    Hemoglobin 15.4 13.3 - 17.7 g/dL    Hematocrit 46.2 40.0 - 53.0 %    MCV 91 78 - 100 fL    MCH 30.2 26.5 - 33.0 pg    MCHC 33.3 31.5 - 36.5 g/dL    RDW 13.2 10.0 - 15.0 %    Platelet Count 244 150 - 450 10e3/uL    % Neutrophils 61 %    % Lymphocytes 23 %    % Monocytes 12 %    % Eosinophils 3 %    % Basophils 1 %    % Immature Granulocytes 0 %    NRBCs per 100 WBC 0 <1 /100    Absolute Neutrophils 4.2 1.6 - 8.3 10e3/uL    Absolute Lymphocytes 1.6 0.8 - 5.3 10e3/uL    Absolute Monocytes 0.9 0.0 - 1.3 10e3/uL    Absolute Eosinophils 0.2 0.0 - 0.7 10e3/uL    Absolute Basophils 0.1 0.0 - 0.2 10e3/uL    Absolute  "Immature Granulocytes 0.0 <=0.4 10e3/uL    Absolute NRBCs 0.0 10e3/uL       ASSESSMENT/PLAN:       ICD-10-CM    1. Annual physical exam  Z00.00    2. Ischemic cardiomyopathy  I25.5 atorvastatin (LIPITOR) 80 MG tablet     lisinopril (ZESTRIL) 10 MG tablet     lisinopril (ZESTRIL) 5 MG tablet     metoprolol succinate ER (TOPROL XL) 25 MG 24 hr tablet     ezetimibe (ZETIA) 10 MG tablet     Hepatic Function Panel     Lipid Panel   3. Multiple vessel coronary artery disease  I25.10 lisinopril (ZESTRIL) 10 MG tablet     lisinopril (ZESTRIL) 5 MG tablet     ezetimibe (ZETIA) 10 MG tablet     Hepatic Function Panel     Lipid Panel   4. Need for hepatitis C screening test  Z11.59          Patient has been advised of split billing requirements and indicates understanding: Yes     Blood pressure good. Cardiology recommended LDL under 70, above goal on max atorvastatin. Discussed benefit of ezetimibe. Recommend repeat trial and check lipids while on the ezetimibe and atorvastatin to determine if it is providing benefit. Ordered future labs. Refilled medications      COUNSELING:  Reviewed preventive health counseling, as reflected in patient instructions       Regular exercise       Healthy diet/nutrition       Vision screening       Hearing screening       Immunizations    Placed on list to receive Shingrix from shot nurse    Discussed pneumonia vaccination. He is eligible due to CAD. Prevnar 20 recommended, historically would have been Pneumovax. He will consider options and can receive either from shot nurse    Consider 4th COVID vaccine. Is able to use Paxlovid if needed           Consider Hep C screening for all patients one time for ages 18-79 years - low risk.        HIV - low risk, discontinued reminder       Colorectal cancer screening - due 2024    Estimated body mass index is 30.53 kg/m  as calculated from the following:    Height as of this encounter: 1.772 m (5' 9.75\").    Weight as of this encounter: 95.8 kg (211 " lb 4 oz).    Weight management plan: Discussed healthy diet and exercise guidelines    He reports that he has quit smoking. He has never used smokeless tobacco.      Counseling Resources:  ATP IV Guidelines  Pooled Cohorts Equation Calculator  FRAX Risk Assessment  ICSI Preventive Guidelines  Dietary Guidelines for Americans, 2010  USDA's MyPlate  ASA Prophylaxis  Lung CA Screening    Omar Rossi MD  [facility]

## 2022-07-26 NOTE — PATIENT INSTRUCTIONS
Consider pneumonia vaccination. Historically we would use the Pneumovax 23 and now the Prevnar 20 is recommended instead  Shingrix from the shot nurse    Recheck lipids and hepatic panel in 3 months on both atorvastatin and ezetemibe  Finish Brilinta

## 2022-09-04 DIAGNOSIS — I25.5 ISCHEMIC CARDIOMYOPATHY: ICD-10-CM

## 2022-09-04 DIAGNOSIS — I25.10 MULTIPLE VESSEL CORONARY ARTERY DISEASE: ICD-10-CM

## 2022-09-06 RX ORDER — TICAGRELOR 90 MG/1
TABLET ORAL
Qty: 180 TABLET | Refills: 0 | OUTPATIENT
Start: 2022-09-06

## 2022-09-06 NOTE — TELEPHONE ENCOUNTER
"[facility] sent Rx request for the following:      Brilinta 90 mg tablet      Last Prescription Date:   6/16/2022  Last Fill Qty/Refills:         180, R-0        Medication discontinued on 7/26/22, per note \"Done with Brilinta in July as cardiology recommended 30 months total from MI in Jan 2020.\" Pharmacy notified.     Jorge Wade RN, BSN  ....................  9/6/2022   2:41 PM      "

## 2023-01-11 ENCOUNTER — OFFICE VISIT (OUTPATIENT)
Dept: FAMILY MEDICINE | Facility: OTHER | Age: 53
End: 2023-01-11
Attending: PHYSICIAN ASSISTANT
Payer: COMMERCIAL

## 2023-01-11 VITALS
RESPIRATION RATE: 20 BRPM | BODY MASS INDEX: 31.5 KG/M2 | SYSTOLIC BLOOD PRESSURE: 132 MMHG | TEMPERATURE: 97.1 F | DIASTOLIC BLOOD PRESSURE: 84 MMHG | HEART RATE: 60 BPM | OXYGEN SATURATION: 100 % | WEIGHT: 218 LBS

## 2023-01-11 DIAGNOSIS — M53.3 SACROILIAC JOINT PAIN: Primary | ICD-10-CM

## 2023-01-11 PROCEDURE — 99213 OFFICE O/P EST LOW 20 MIN: CPT | Performed by: PHYSICIAN ASSISTANT

## 2023-01-11 RX ORDER — METHYLPREDNISOLONE 4 MG
TABLET, DOSE PACK ORAL
Qty: 21 TABLET | Refills: 0 | Status: SHIPPED | OUTPATIENT
Start: 2023-01-11 | End: 2023-11-20

## 2023-01-11 RX ORDER — CYCLOBENZAPRINE HCL 10 MG
5-10 TABLET ORAL 3 TIMES DAILY PRN
Qty: 30 TABLET | Refills: 2 | Status: SHIPPED | OUTPATIENT
Start: 2023-01-11 | End: 2023-11-20

## 2023-01-11 ASSESSMENT — PATIENT HEALTH QUESTIONNAIRE - PHQ9: SUM OF ALL RESPONSES TO PHQ QUESTIONS 1-9: 2

## 2023-01-11 ASSESSMENT — ANXIETY QUESTIONNAIRES
2. NOT BEING ABLE TO STOP OR CONTROL WORRYING: NOT AT ALL
IF YOU CHECKED OFF ANY PROBLEMS ON THIS QUESTIONNAIRE, HOW DIFFICULT HAVE THESE PROBLEMS MADE IT FOR YOU TO DO YOUR WORK, TAKE CARE OF THINGS AT HOME, OR GET ALONG WITH OTHER PEOPLE: SOMEWHAT DIFFICULT
7. FEELING AFRAID AS IF SOMETHING AWFUL MIGHT HAPPEN: NOT AT ALL
GAD7 TOTAL SCORE: 4
5. BEING SO RESTLESS THAT IT IS HARD TO SIT STILL: MORE THAN HALF THE DAYS
6. BECOMING EASILY ANNOYED OR IRRITABLE: NOT AT ALL
3. WORRYING TOO MUCH ABOUT DIFFERENT THINGS: NOT AT ALL
4. TROUBLE RELAXING: MORE THAN HALF THE DAYS
GAD7 TOTAL SCORE: 4
1. FEELING NERVOUS, ANXIOUS, OR ON EDGE: NOT AT ALL

## 2023-01-11 ASSESSMENT — PAIN SCALES - GENERAL: PAINLEVEL: MODERATE PAIN (5)

## 2023-01-11 ASSESSMENT — ENCOUNTER SYMPTOMS: HIP PAIN: 1

## 2023-01-11 NOTE — LETTER
January 11, 2023      Woody Graves  [address] [address]        To Whom It May Concern:    Woody Graves was seen in our clinic. Please excuse from work from 1/11-1/12/2023.  He may return to work without restrictions.      Sincerely,        Emily Pete PA-C

## 2023-01-11 NOTE — NURSING NOTE
Pt presents to clinic today for right SI/ hip pain that is chronic. Pt saw chiropractor on Tuesday morning and states its gradually getting worse. Hurts to stand up from a chair.       FOOD SECURITY SCREENING QUESTIONS:    The next two questions are to help us understand your food security.  If you are feeling you need any assistance in this area, we have resources available to support you today.    Hunger Vital Signs:  Within the past 12 months we worried whether our food would run out before we got money to buy more. Never  Within the past 12 months the food we bought just didn't last and we didn't have money to get more. Never          Medication Reconciliation: complete  Caitlin Mayen LPN,LPN on 1/11/2023 at 10:08 AM

## 2023-01-11 NOTE — PROGRESS NOTES
Assessment & Plan   Problem List Items Addressed This Visit    None  Visit Diagnoses     Sacroiliac joint pain    -  Primary    Relevant Medications    methylPREDNISolone (MEDROL DOSEPAK) 4 MG tablet therapy pack    cyclobenzaprine (FLEXERIL) 10 MG tablet         Encouraged to take ibuprofen (400-800 mg) for relief up to 4 times per day.  Encouraged rest and elevation.  Encouraged to use ice or heat 15 minutes at a time several times per day to decrease pain. Return to clinic in 1-2 weeks as necessary for persistent pain. Return to clinic with any change or worsening of symptoms.     Patient was given a Medrol Dosepak and Flexeril to use as needed.  Can continue to see the chiropractor if preferred.  Offered physical therapy referral however declined at this time.    30 minutes spent on the date of the encounter doing chart review, history and exam, documentation and further activities per the note       See Patient Instructions    Return if symptoms worsen or fail to improve.    Emily Pete PA-C  [facility]    Subjective   Ty is a 52 year old, presenting for the following health issues:  Hip Pain (Right SI pain)      Hip Pain    History of Present Illness       Back Pain:  He presents for follow up of back pain. Patient's back pain is a chronic problem.  Location of back pain:  Right lower back and right hip  Description of back pain: sharp  Back pain spreads: nowhere    Since patient first noticed back pain, pain is: gradually improving  Does back pain interfere with his job:  Yes      He eats 0-1 servings of fruits and vegetables daily.He consumes 2 sweetened beverage(s) daily.He exercises with enough effort to increase his heart rate 10 to 19 minutes per day.  He exercises with enough effort to increase his heart rate 3 or less days per week.   He is taking medications regularly.       Pain History:  When did you first notice your pain? - Chronic Pain   Have you seen this provider for  your pain in the past? No   Where in your body do your have pain? Right hip/ SI  Are you seeing anyone else for your pain? No  What makes your pain better? Ibuprofen, time   What makes your pain worse? Getting up from a sitting position, bending wrong   How has pain affected your ability to work? Much harder to work   Who lives in your household? Wife            PHQ-9 SCORE 1/27/2020 5/17/2021 1/11/2023   PHQ-9 Total Score 2 0 2     SEFERINO-7 SCORE 5/17/2021 1/11/2023   Total Score 0 4     PEG Score 1/11/2023   PEG Total Score 7     Patient has chronic sciatica issues.  States that symptoms worsen with bending and twisting.  Initially started around early 2000.  2 days ago he was putting away boxes.  Was climbing the ladder frequently.  Unsure if he lifted or twisted the wrong way.  Threw out his back.  Saw a chiropractor yesterday.  States that his back slips out easily.  Hard to sleep at night with the current pain.  Previously took Flexeril and Tylenol with codeine in combination with physical therapy for treatment.  He has also seen a chiropractor in the past for flareups.  Using ice and Biofreeze.  Right-sided SI pain.  Has spasms and feels like it locks up.  No bowel or bladder incontinence.  No fall or trauma.  No injury.    Review of Systems   Constitutional, HEENT, cardiovascular, pulmonary, gi and gu systems are negative, except as otherwise noted.      Objective    /84 (BP Location: Right arm, Patient Position: Sitting, Cuff Size: Adult Large)   Pulse 60   Temp 97.1  F (36.2  C) (Tympanic)   Resp 20   Wt 98.9 kg (218 lb)   SpO2 100%   BMI 31.50 kg/m    Body mass index is 31.5 kg/m .  Physical Exam  Vitals and nursing note reviewed.   Constitutional:       Appearance: Normal appearance.   Musculoskeletal:         General: Tenderness present. No swelling or signs of injury. Normal range of motion.      Comments: No spinal or paraspinous muscle pain with palpation.  Right SI joint pain with  palpation.  No bruising or swelling appreciated.  Low back pain with right hip flexion and extension.   Skin:     General: Skin is warm and dry.   Neurological:      General: No focal deficit present.      Mental Status: He is alert and oriented to person, place, and time.      Gait: Gait normal.   Psychiatric:         Mood and Affect: Mood normal.         Behavior: Behavior normal.

## 2023-01-11 NOTE — PATIENT INSTRUCTIONS
Encouraged to take ibuprofen (400-800 mg) for relief up to 4 times per day.  Encouraged rest and elevation.  Encouraged to use ice or heat 15 minutes at a time several times per day to decrease pain. Return to clinic in 1-2 weeks as necessary for persistent pain. Return to clinic with any change or worsening of symptoms.

## 2023-09-03 DIAGNOSIS — I25.5 ISCHEMIC CARDIOMYOPATHY: ICD-10-CM

## 2023-09-03 DIAGNOSIS — I25.10 MULTIPLE VESSEL CORONARY ARTERY DISEASE: ICD-10-CM

## 2023-09-08 DIAGNOSIS — I25.10 MULTIPLE VESSEL CORONARY ARTERY DISEASE: ICD-10-CM

## 2023-09-08 DIAGNOSIS — I25.5 ISCHEMIC CARDIOMYOPATHY: ICD-10-CM

## 2023-09-08 RX ORDER — EZETIMIBE 10 MG/1
10 TABLET ORAL DAILY
Qty: 90 TABLET | Refills: 4 | Status: SHIPPED | OUTPATIENT
Start: 2023-09-08 | End: 2023-11-20

## 2023-09-08 NOTE — TELEPHONE ENCOUNTER
"Requested Prescriptions   Pending Prescriptions Disp Refills    ezetimibe (ZETIA) 10 MG tablet [Pharmacy Med Name: ezetimibe 10 mg tablet] 90 tablet 4     Sig: TAKE 1 TABLET BY MOUTH DAILY       Antihyperlipidemic agents Failed - 9/3/2023  8:02 AM        Failed - Lipid panel on file in past 12 mos     Recent Labs   Lab Test 06/29/22  0724   CHOL 177   TRIG 87   HDL 68   LDL 92   NHDL 109               Failed - Normal serum ALT on record in past 12 mos     Recent Labs   Lab Test 06/29/22  0724 01/11/20  1515 10/11/17  0803   ALT 27   < >  --    GICHALT  --   --  31    < > = values in this interval not displayed.             Passed - Recent (12 mo) or future (30 days) visit within the authorizing provider's specialty     Patient has had an office visit with the authorizing provider or a provider within the authorizing providers department within the previous 12 mos or has a future within next 30 days. See \"Patient Info\" tab in inbasket, or \"Choose Columns\" in Meds & Orders section of the refill encounter.              Passed - Medication is active on med list        Passed - Patient is age 18 years or older            LOV: 1/11/23  Future Office visit:    Next 5 appointments (look out 90 days)      Nov 20, 2023 12:40 PM  PHYSICAL with Andriy Hernandez MD  [facility] ([facility] ) [address]  [facility] [address]  [phone]            Routing refill request to provider for review/approval.    Su Pabon RN  ....................  9/8/2023   1:21 PM      "

## 2023-09-13 RX ORDER — ATORVASTATIN CALCIUM 80 MG/1
80 TABLET, FILM COATED ORAL DAILY
Qty: 90 TABLET | Refills: 0 | Status: SHIPPED | OUTPATIENT
Start: 2023-09-13 | End: 2023-11-20

## 2023-09-13 RX ORDER — LISINOPRIL 10 MG/1
10 TABLET ORAL DAILY
Qty: 90 TABLET | Refills: 0 | Status: SHIPPED | OUTPATIENT
Start: 2023-09-13 | End: 2023-11-20

## 2023-09-13 RX ORDER — METOPROLOL SUCCINATE 25 MG/1
25 TABLET, EXTENDED RELEASE ORAL DAILY
Qty: 90 TABLET | Refills: 0 | Status: SHIPPED | OUTPATIENT
Start: 2023-09-13 | End: 2023-11-20

## 2023-09-13 RX ORDER — LISINOPRIL 5 MG/1
5 TABLET ORAL DAILY
Qty: 90 TABLET | Refills: 0 | Status: SHIPPED | OUTPATIENT
Start: 2023-09-13 | End: 2023-11-20

## 2023-09-13 NOTE — TELEPHONE ENCOUNTER
[facility] sent Rx request for the following:      Requested Prescriptions   Pending Prescriptions Disp Refills    lisinopril (ZESTRIL) 10 MG tablet [Pharmacy Med Name: lisinopril 10 mg tablet] 90 tablet 4     Sig: TAKE 1 TABLET BY MOUTH DAILY       ACE Inhibitors (Including Combos) Protocol Failed - 9/13/2023  2:25 PM        Failed - Normal serum creatinine on file in past 12 months     Recent Labs   Lab Test 06/29/22  0724   CR 1.24     Ok to refill medication if creatinine is low        Failed - Normal serum potassium on file in past 12 months     Recent Labs   Lab Test 06/29/22  0724 01/29/20  0910 01/20/20  0550   POTASSIUM 4.4   < >  --    15075  --   --  4.1    < > = values in this interval not displayed.             lisinopril (ZESTRIL) 5 MG tablet [Pharmacy Med Name: lisinopril 5 mg tablet] 90 tablet 4     Sig: TAKE 1 TABLET BY MOUTH DAILY       ACE Inhibitors (Including Combos) Protocol Failed - 9/13/2023  2:25 PM        Failed - Normal serum creatinine on file in past 12 months     Recent Labs   Lab Test 06/29/22  0724   CR 1.24     Ok to refill medication if creatinine is low        Failed - Normal serum potassium on file in past 12 months     Recent Labs   Lab Test 06/29/22  0724 01/29/20  0910 01/20/20  0550   POTASSIUM 4.4   < >  --    [id number]  --   --  4.1    < > = values in this interval not displayed.          metoprolol succinate ER (TOPROL XL) 25 MG 24 hr tablet [Pharmacy Med Name: metoprolol succinate ER 25 mg tablet,extended release 24 hr] 90 tablet 4     Sig: TAKE 1 TABLET BY MOUTH DAILY       Beta-Blockers Protocol Passed - 9/13/2023  2:25 PM          atorvastatin (LIPITOR) 80 MG tablet [Pharmacy Med Name: atorvastatin 80 mg tablet] 90 tablet 4     Sig: TAKE 1 TABLET BY MOUTH DAILY       Statins Protocol Failed - 9/13/2023  2:26 PM        Failed - LDL on file in past 12 months     Recent Labs   Lab Test 06/29/22  0724   LDL 92          Last Prescription Date:   7/26/22  Last Fill  Qty/Refills:         90, R-4    Last Office Visit:              7/26/22   Future Office visit:           11/20/23    Patient had a visit with primary on 7/26/22, and appointment Deborah on 1/11/23 and a upcoming appointment with Dr. Hernandez. Melissa Alfonso, RN on 9/13/2023 at 2:38 PM

## 2023-11-20 ENCOUNTER — OFFICE VISIT (OUTPATIENT)
Dept: INTERNAL MEDICINE | Facility: OTHER | Age: 53
End: 2023-11-20
Attending: STUDENT IN AN ORGANIZED HEALTH CARE EDUCATION/TRAINING PROGRAM
Payer: COMMERCIAL

## 2023-11-20 VITALS
BODY MASS INDEX: 31.24 KG/M2 | TEMPERATURE: 97.3 F | HEIGHT: 70 IN | WEIGHT: 218.2 LBS | SYSTOLIC BLOOD PRESSURE: 131 MMHG | OXYGEN SATURATION: 99 % | HEART RATE: 94 BPM | DIASTOLIC BLOOD PRESSURE: 80 MMHG | RESPIRATION RATE: 18 BRPM

## 2023-11-20 DIAGNOSIS — I10 BENIGN ESSENTIAL HYPERTENSION: ICD-10-CM

## 2023-11-20 DIAGNOSIS — Z95.5 S/P DRUG ELUTING CORONARY STENT PLACEMENT: ICD-10-CM

## 2023-11-20 DIAGNOSIS — Z00.00 ANNUAL PHYSICAL EXAM: Primary | ICD-10-CM

## 2023-11-20 DIAGNOSIS — I25.5 ISCHEMIC CARDIOMYOPATHY: ICD-10-CM

## 2023-11-20 DIAGNOSIS — E78.2 MIXED HYPERLIPIDEMIA: ICD-10-CM

## 2023-11-20 DIAGNOSIS — Z12.5 ENCOUNTER FOR SCREENING FOR MALIGNANT NEOPLASM OF PROSTATE: ICD-10-CM

## 2023-11-20 DIAGNOSIS — R73.9 ELEVATED RANDOM BLOOD GLUCOSE LEVEL: ICD-10-CM

## 2023-11-20 DIAGNOSIS — I21.3 ST ELEVATION MYOCARDIAL INFARCTION (STEMI), UNSPECIFIED ARTERY (H): ICD-10-CM

## 2023-11-20 DIAGNOSIS — I25.10 MULTIPLE VESSEL CORONARY ARTERY DISEASE: ICD-10-CM

## 2023-11-20 PROCEDURE — 99396 PREV VISIT EST AGE 40-64: CPT | Performed by: STUDENT IN AN ORGANIZED HEALTH CARE EDUCATION/TRAINING PROGRAM

## 2023-11-20 RX ORDER — METOPROLOL SUCCINATE 25 MG/1
25 TABLET, EXTENDED RELEASE ORAL DAILY
Qty: 90 TABLET | Refills: 4 | Status: CANCELLED | OUTPATIENT
Start: 2023-11-20

## 2023-11-20 RX ORDER — LISINOPRIL 20 MG/1
20 TABLET ORAL DAILY
Qty: 90 TABLET | Refills: 4 | Status: SHIPPED | OUTPATIENT
Start: 2023-11-20

## 2023-11-20 RX ORDER — ROSUVASTATIN CALCIUM 40 MG/1
40 TABLET, COATED ORAL DAILY
Qty: 90 TABLET | Refills: 4 | Status: SHIPPED | OUTPATIENT
Start: 2023-11-20

## 2023-11-20 RX ORDER — LISINOPRIL 10 MG/1
10 TABLET ORAL DAILY
Qty: 90 TABLET | Refills: 4 | Status: CANCELLED | OUTPATIENT
Start: 2023-11-20

## 2023-11-20 RX ORDER — LISINOPRIL 5 MG/1
5 TABLET ORAL DAILY
Qty: 90 TABLET | Refills: 4 | Status: CANCELLED | OUTPATIENT
Start: 2023-11-20

## 2023-11-20 RX ORDER — EZETIMIBE 10 MG/1
10 TABLET ORAL DAILY
Qty: 90 TABLET | Refills: 4 | Status: CANCELLED | OUTPATIENT
Start: 2023-11-20

## 2023-11-20 RX ORDER — ATORVASTATIN CALCIUM 80 MG/1
80 TABLET, FILM COATED ORAL DAILY
Qty: 90 TABLET | Refills: 4 | Status: CANCELLED | OUTPATIENT
Start: 2023-11-20

## 2023-11-20 ASSESSMENT — ENCOUNTER SYMPTOMS
NAUSEA: 0
SORE THROAT: 0
JOINT SWELLING: 0
FREQUENCY: 0
CHILLS: 0
SHORTNESS OF BREATH: 0
HEMATURIA: 0
PALPITATIONS: 0
DIARRHEA: 0
COUGH: 0
DIZZINESS: 0
HEADACHES: 0
HEMATOCHEZIA: 0
NERVOUS/ANXIOUS: 0
CONSTIPATION: 0
ARTHRALGIAS: 0
ABDOMINAL PAIN: 0
WEAKNESS: 0
MYALGIAS: 0
FEVER: 0
DYSURIA: 0
HEARTBURN: 0
EYE PAIN: 0
PARESTHESIAS: 0

## 2023-11-20 ASSESSMENT — PAIN SCALES - GENERAL: PAINLEVEL: NO PAIN (0)

## 2023-11-20 NOTE — NURSING NOTE
"Chief Complaint   Patient presents with    Physical    Recheck Medication    Establish Care         Initial /80 (BP Location: Right arm, Patient Position: Sitting, Cuff Size: Adult Regular)   Pulse 94   Temp 97.3  F (36.3  C) (Temporal)   Resp 18   Ht 1.778 m (5' 10\")   Wt 99 kg (218 lb 3.2 oz)   SpO2 99%   BMI 31.31 kg/m   Estimated body mass index is 31.31 kg/m  as calculated from the following:    Height as of this encounter: 1.778 m (5' 10\").    Weight as of this encounter: 99 kg (218 lb 3.2 oz).       FOOD SECURITY SCREENING QUESTIONS:    The next two questions are to help us understand your food security.  If you are feeling you need any assistance in this area, we have resources available to support you today.    Hunger Vital Signs:  Within the past 12 months we worried whether our food would run out before we got money to buy more. Never  Within the past 12 months the food we bought just didn't last and we didn't have money to get more. Never  Kimberly Flores LPN on 11/20/2023 at 12:45 PM     Kimberly Flores   "

## 2023-11-20 NOTE — PROGRESS NOTES
"SUBJECTIVE:   Bryon is a 53 year old, presenting for the following:  Physical, Recheck Medication, and Establish Care        11/20/2023    12:41 PM   Additional Questions   Roomed by Kimberly TAN CNA/VIVI       Healthy Habits:     Getting at least 3 servings of Calcium per day:  Yes    Bi-annual eye exam:  NO    Dental care twice a year:  NO    Sleep apnea or symptoms of sleep apnea:  None    Diet:  Regular (no restrictions)    Frequency of exercise:  2-3 days/week    Duration of exercise:  15-30 minutes    Taking medications regularly:  Yes    Medication side effects:  None    Additional concerns today:  No      Today's PHQ-2 Score:       11/20/2023    12:35 PM   PHQ-2 ( 1999 [employer])   Q1: Little interest or pleasure in doing things 0   Q2: Feeling down, depressed or hopeless 0   PHQ-2 Score 0   Q1: Little interest or pleasure in doing things Not at all   Q2: Feeling down, depressed or hopeless Not at all   PHQ-2 Score 0       Cologard negative 2021.     Reviewed his extensive coronary artery disease history with STEMI in 2020.  Reviewed and renewed his medication list.  He endorses that he has some mild drainage in the back of his throat no significant peripheral edema.  No chest pain.  He occasionally will have some beers with friends.  Does not drink regularly or daily.  Had trouble with alcohol in the past.    Used to smoke cigars.  Quit in 2020.    Social History     Tobacco Use    Smoking status: Former     Types: Cigars     Quit date: 2020     Years since quitting: 3.8    Smokeless tobacco: Never    Tobacco comments:     Quit smoking: cigars occasional during deer hunting. Stopped after STEMI   Substance Use Topics    Alcohol use: Yes     Comment: most weekends-no weekdays           11/20/2023    12:35 PM   Alcohol Use   Prescreen: >3 drinks/day or >7 drinks/week? Yes   AUDIT SCORE  6       Last PSA: No results found for: \"PSA\"    Reviewed orders with patient. Reviewed health maintenance and updated orders " "accordingly - Yes  Lab work is in process    Reviewed and updated as needed this visit by clinical staff   Tobacco  Allergies  Meds   Med Hx  Surg Hx  Fam Hx  Soc Hx        Reviewed and updated as needed this visit by Provider   Tobacco                   Review of Systems   Constitutional:  Negative for chills and fever.   HENT:  Negative for congestion, ear pain, hearing loss and sore throat.    Eyes:  Negative for pain and visual disturbance.   Respiratory:  Negative for cough and shortness of breath.    Cardiovascular:  Negative for chest pain, palpitations and peripheral edema.   Gastrointestinal:  Negative for abdominal pain, constipation, diarrhea, heartburn, hematochezia and nausea.   Genitourinary:  Negative for dysuria, frequency, genital sores, hematuria, impotence, penile discharge and urgency.   Musculoskeletal:  Negative for arthralgias, joint swelling and myalgias.   Skin:  Negative for rash.   Neurological:  Negative for dizziness, weakness, headaches and paresthesias.   Psychiatric/Behavioral:  Negative for mood changes. The patient is not nervous/anxious.          OBJECTIVE:   /80 (BP Location: Right arm, Patient Position: Sitting, Cuff Size: Adult Regular)   Pulse 94   Temp 97.3  F (36.3  C) (Temporal)   Resp 18   Ht 1.778 m (5' 10\")   Wt 99 kg (218 lb 3.2 oz)   SpO2 99%   BMI 31.31 kg/m      Physical Exam  Vitals and nursing note reviewed.   Constitutional:       General: He is not in acute distress.     Appearance: He is well-developed. He is not diaphoretic.   HENT:      Head: Normocephalic and atraumatic.      Right Ear: Tympanic membrane, ear canal and external ear normal.      Left Ear: Tympanic membrane, ear canal and external ear normal.      Mouth/Throat:      Mouth: Mucous membranes are moist.      Pharynx: Oropharynx is clear.   Eyes:      General: No scleral icterus.     Conjunctiva/sclera: Conjunctivae normal.   Cardiovascular:      Rate and Rhythm: Normal rate and " regular rhythm.      Heart sounds: No murmur heard.  Pulmonary:      Effort: Pulmonary effort is normal.      Breath sounds: Normal breath sounds. No wheezing.   Abdominal:      Palpations: Abdomen is soft. There is no mass.      Tenderness: There is no abdominal tenderness.   Musculoskeletal:         General: No deformity.      Cervical back: Neck supple.   Lymphadenopathy:      Cervical: No cervical adenopathy.   Skin:     General: Skin is warm and dry.      Coloration: Skin is not jaundiced.      Findings: No rash.      Comments: Scattered dermatofibroma, skin tags, has a 0.5 cm sebaceous cyst at the base of his neck.   Neurological:      Mental Status: He is alert and oriented to person, place, and time. Mental status is at baseline.   Psychiatric:         Mood and Affect: Mood normal.         Behavior: Behavior normal.         Thought Content: Thought content normal.           ASSESSMENT/PLAN:       ICD-10-CM    1. Annual physical exam  Z00.00 Comprehensive Metabolic Panel     CBC W PLT No Diff      2. Ischemic cardiomyopathy  I25.5 Brain Natriuretic Peptide (BNP)     Echocardiogram Complete      3. Multiple vessel coronary artery disease  I25.10       4. Elevated random blood glucose level  R73.09       5. S/P drug eluting coronary stent placement  Z95.5       6. ST elevation myocardial infarction (STEMI), unspecified artery (H)  I21.3       7. Mixed hyperlipidemia  E78.2 Lipid Panel     rosuvastatin (CRESTOR) 40 MG tablet      8. Encounter for screening for malignant neoplasm of prostate  Z12.5 PSA Screen GH      9. Benign essential hypertension  I10 lisinopril (ZESTRIL) 20 MG tablet        Annual physical exam: Updated past medical history surgical history social history and reviewed and renewed his medications.  Cologuaoscar completed in 2021.  We will repeat next year.  Obtain basic screening labs and he will return to clinic fasting tomorrow.    Coronary artery disease: Status post stent x4 for STEMI in  "2020.  Unfortunately suffered cardiac arrest at that time.  His ejection fraction has recovered to normal since.  No echo for at least 2 years on my review we will repeat this today.  We will stop his Zetia and atorvastatin and change him to rosuvastatin 40 mg daily.  Repeat echo.  Increase his lisinopril to 20 mg daily.  No longer has heart failure, he is 3 years out from his STEMI and no history of arrhythmia so could stop his beta-blocker at this point.    Elevated random blood sugar: Borderline prediabetes in the past.  We agreed that if his fasting blood sugar is elevated we will add on an A1c but was normal last year.        COUNSELING:   Reviewed preventive health counseling, as reflected in patient instructions      BMI:   Estimated body mass index is 31.31 kg/m  as calculated from the following:    Height as of this encounter: 1.778 m (5' 10\").    Weight as of this encounter: 99 kg (218 lb 3.2 oz).   Weight management plan: Discussed healthy diet and exercise guidelines      He reports that he quit smoking about 3 years ago. His smoking use included cigars. He has never used smokeless tobacco.          Andriy Hernandez MD  [facility]  "

## 2023-11-21 ENCOUNTER — ALLIED HEALTH/NURSE VISIT (OUTPATIENT)
Dept: FAMILY MEDICINE | Facility: OTHER | Age: 53
End: 2023-11-21
Payer: COMMERCIAL

## 2023-11-21 DIAGNOSIS — Z23 ENCOUNTER FOR IMMUNIZATION: Primary | ICD-10-CM

## 2023-11-21 PROCEDURE — 90471 IMMUNIZATION ADMIN: CPT

## 2023-11-21 PROCEDURE — 90750 HZV VACC RECOMBINANT IM: CPT

## 2023-11-21 NOTE — PROGRESS NOTES
Immunization Documentation  Verified patient's first and last name, and . Stated reason for visit today is to receive shingrix vaccine.  Denied any concerns with previous immunizations. Allergies reviewed. VIS handout(s) denied. Vaccine prepared and administered per standing order. Administration documented in IMMUNIZATIONS (see flowsheet and order for further information).     Cely Lopez RN ....................  2023   7:56 AM

## 2023-12-06 ENCOUNTER — HOSPITAL ENCOUNTER (OUTPATIENT)
Dept: CARDIOLOGY | Facility: OTHER | Age: 53
Discharge: HOME | End: 2023-12-06
Attending: STUDENT IN AN ORGANIZED HEALTH CARE EDUCATION/TRAINING PROGRAM | Admitting: STUDENT IN AN ORGANIZED HEALTH CARE EDUCATION/TRAINING PROGRAM
Payer: COMMERCIAL

## 2023-12-06 DIAGNOSIS — I25.5 ISCHEMIC CARDIOMYOPATHY: ICD-10-CM

## 2023-12-06 LAB — LVEF ECHO: NORMAL

## 2023-12-06 PROCEDURE — 93306 TTE W/DOPPLER COMPLETE: CPT

## 2023-12-06 PROCEDURE — 93306 TTE W/DOPPLER COMPLETE: CPT | Mod: 26 | Performed by: INTERNAL MEDICINE

## 2023-12-21 ENCOUNTER — OFFICE VISIT (OUTPATIENT)
Dept: FAMILY MEDICINE | Facility: OTHER | Age: 53
End: 2023-12-21
Attending: FAMILY MEDICINE
Payer: COMMERCIAL

## 2023-12-21 VITALS
BODY MASS INDEX: 30.49 KG/M2 | TEMPERATURE: 97.6 F | HEART RATE: 74 BPM | OXYGEN SATURATION: 98 % | SYSTOLIC BLOOD PRESSURE: 126 MMHG | DIASTOLIC BLOOD PRESSURE: 78 MMHG | RESPIRATION RATE: 16 BRPM | HEIGHT: 70 IN | WEIGHT: 213 LBS

## 2023-12-21 DIAGNOSIS — H10.33 ACUTE BACTERIAL CONJUNCTIVITIS OF BOTH EYES: ICD-10-CM

## 2023-12-21 DIAGNOSIS — H10.31 ACUTE CONJUNCTIVITIS OF RIGHT EYE, UNSPECIFIED ACUTE CONJUNCTIVITIS TYPE: Primary | ICD-10-CM

## 2023-12-21 PROCEDURE — 99213 OFFICE O/P EST LOW 20 MIN: CPT | Performed by: FAMILY MEDICINE

## 2023-12-21 RX ORDER — POLYMYXIN B SULFATE AND TRIMETHOPRIM 1; 10000 MG/ML; [USP'U]/ML
1-2 SOLUTION OPHTHALMIC EVERY 6 HOURS
Qty: 10 ML | Refills: 0 | Status: SHIPPED | OUTPATIENT
Start: 2023-12-21

## 2023-12-21 ASSESSMENT — PAIN SCALES - GENERAL: PAINLEVEL: NO PAIN (0)

## 2023-12-21 NOTE — PROGRESS NOTES
"  Assessment & Plan     Acute conjunctivitis of right eye, unspecified acute conjunctivitis type  Recommend topical antibiotics as ordered.  Frequent washing of hands.  Avoid touching the face and eyes.  - polymixin b-trimethoprim (POLYTRIM) 22516-2.1 UNIT/ML-% ophthalmic solution; Place 1-2 drops into both eyes every 6 hours        No follow-ups on file.    Argelia Pearson MD  [facility]    Subjective   Ty is a 53 year old, presenting for the following health issues:  Eye Problem (X1 day)      History of Present Illness       Reason for visit:  Pink eye    He eats 2-3 servings of fruits and vegetables daily.He consumes 0 sweetened beverage(s) daily.He exercises with enough effort to increase his heart rate 30 to 60 minutes per day.  He exercises with enough effort to increase his heart rate 6 days per week.   He is taking medications regularly.     53-year-old  presents with acute onset of right eye redness and drainage.  He has been battling nasal congestion and sinus pressure for the past 5 to 7 days.  Woke up this morning with eye redness.  No foreign body sensation.  No known sick contact.    He feels a sinus symptoms are improving with supportive cares.    6 denies photophobia or change in acuity.        Review of Systems         Objective    /78 (BP Location: Right arm, Patient Position: Sitting, Cuff Size: Adult Regular)   Pulse 74   Temp 97.6  F (36.4  C) (Tympanic)   Resp 16   Ht 1.778 m (5' 10\")   Wt 96.6 kg (213 lb)   SpO2 98%   BMI 30.56 kg/m    Body mass index is 30.56 kg/m .  Physical Exam  HENT:      Head: Normocephalic and atraumatic.      Right Ear: Tympanic membrane and ear canal normal.      Left Ear: Tympanic membrane and ear canal normal.      Nose: Congestion present.      Mouth/Throat:      Mouth: Mucous membranes are moist.      Pharynx: Oropharynx is clear. No posterior oropharyngeal erythema.   Eyes:      Comments: Right eye with " conjunctival redness and clear drainage.  Left eye is normal.   Pulmonary:      Effort: Pulmonary effort is normal. No respiratory distress.      Breath sounds: Normal breath sounds.   Neurological:      Mental Status: He is alert.

## 2023-12-21 NOTE — NURSING NOTE
"Chief Complaint   Patient presents with    Conjunctivitis     X1 day, right eye     Patient presents for evaulation of right eye. States it is pink, watery, and itchy x1 day.     Initial /78 (BP Location: Right arm, Patient Position: Sitting, Cuff Size: Adult Regular)   Pulse 74   Temp 97.6  F (36.4  C) (Tympanic)   Resp 16   Ht 1.778 m (5' 10\")   Wt 96.6 kg (213 lb)   SpO2 98%   BMI 30.56 kg/m   Estimated body mass index is 30.56 kg/m  as calculated from the following:    Height as of this encounter: 1.778 m (5' 10\").    Weight as of this encounter: 96.6 kg (213 lb).  Medication Review: complete    The next two questions are to help us understand your food security.  If you are feeling you need any assistance in this area, we have resources available to support you today.          12/21/2023   SDOH- Food Insecurity   Within the past 12 months, did you worry that your food would run out before you got money to buy more? N   Within the past 12 months, did the food you bought just not last and you didn t have money to get more? N         Health Care Directive:  Patient does not have a Health Care Directive or Living Will: Discussed advance care planning with patient; however, patient declined at this time.    Christal Castorena LPN      "

## 2024-08-02 DIAGNOSIS — Z12.11 COLON CANCER SCREENING: ICD-10-CM

## 2024-08-16 ENCOUNTER — ORDERS ONLY (AUTO-RELEASED) (OUTPATIENT)
Dept: ADMISSION | Facility: CLINIC | Age: 54
End: 2024-08-16
Payer: COMMERCIAL

## 2024-08-16 DIAGNOSIS — Z12.11 COLON CANCER SCREENING: ICD-10-CM

## 2024-09-08 LAB — NONINV COLON CA DNA+OCC BLD SCRN STL QL: NORMAL

## 2024-09-10 ENCOUNTER — TELEPHONE (OUTPATIENT)
Dept: INTERNAL MEDICINE | Facility: OTHER | Age: 54
End: 2024-09-10
Payer: COMMERCIAL

## 2024-09-10 NOTE — TELEPHONE ENCOUNTER
Patient presented to counter to schedule annual px. Patient is also requesting lab orders prior to his visit in December and will schedule as it approaches.    Theresa Servin on 9/10/2024 at 7:31 AM

## 2024-09-10 NOTE — TELEPHONE ENCOUNTER
Left message for patient to return call and ask for a nurse in unit 3.     [facility] does not do labs prior to office visits for Px. Labs will be drawn during office visit if necessary. Please advise patient of this when brain is returned.     Rebekah Meyer LPN on 9/10/2024 at 10:25 AM   Ext. [phone]

## 2024-09-10 NOTE — TELEPHONE ENCOUNTER
Patient returned call to writer who informed that [facility] prefers to do labs in office visit. Patient is agreeable with this and has no further questions or concerns at this time.     Rebekah Meyer LPN on 9/10/2024 at 12:54 PM   Ext. [phone]

## 2024-10-14 LAB — NONINV COLON CA DNA+OCC BLD SCRN STL QL: NEGATIVE

## 2024-12-09 ENCOUNTER — OFFICE VISIT (OUTPATIENT)
Dept: INTERNAL MEDICINE | Facility: OTHER | Age: 54
End: 2024-12-09
Attending: STUDENT IN AN ORGANIZED HEALTH CARE EDUCATION/TRAINING PROGRAM
Payer: COMMERCIAL

## 2024-12-09 VITALS
OXYGEN SATURATION: 98 % | RESPIRATION RATE: 16 BRPM | TEMPERATURE: 97.9 F | DIASTOLIC BLOOD PRESSURE: 76 MMHG | WEIGHT: 218.4 LBS | SYSTOLIC BLOOD PRESSURE: 128 MMHG | HEART RATE: 83 BPM | BODY MASS INDEX: 31.26 KG/M2 | HEIGHT: 70 IN

## 2024-12-09 DIAGNOSIS — Z00.00 ANNUAL PHYSICAL EXAM: Primary | ICD-10-CM

## 2024-12-09 DIAGNOSIS — R73.03 PREDIABETES: ICD-10-CM

## 2024-12-09 DIAGNOSIS — Z12.5 ENCOUNTER FOR SCREENING FOR MALIGNANT NEOPLASM OF PROSTATE: ICD-10-CM

## 2024-12-09 DIAGNOSIS — E55.9 VITAMIN D DEFICIENCY: ICD-10-CM

## 2024-12-09 DIAGNOSIS — E78.2 MIXED HYPERLIPIDEMIA: ICD-10-CM

## 2024-12-09 DIAGNOSIS — I21.3 ST ELEVATION MYOCARDIAL INFARCTION (STEMI), UNSPECIFIED ARTERY (H): ICD-10-CM

## 2024-12-09 DIAGNOSIS — I10 BENIGN ESSENTIAL HYPERTENSION: ICD-10-CM

## 2024-12-09 LAB
ALBUMIN SERPL BCG-MCNC: 4.7 G/DL (ref 3.5–5.2)
ALP SERPL-CCNC: 79 U/L (ref 40–150)
ALT SERPL W P-5'-P-CCNC: 36 U/L (ref 0–70)
ANION GAP SERPL CALCULATED.3IONS-SCNC: 5 MMOL/L (ref 7–15)
AST SERPL W P-5'-P-CCNC: 27 U/L (ref 0–45)
BASOPHILS # BLD AUTO: 0.1 10E3/UL (ref 0–0.2)
BASOPHILS NFR BLD AUTO: 1 %
BILIRUB SERPL-MCNC: 0.6 MG/DL
BUN SERPL-MCNC: 21 MG/DL (ref 6–20)
CALCIUM SERPL-MCNC: 9.7 MG/DL (ref 8.8–10.4)
CHLORIDE SERPL-SCNC: 106 MMOL/L (ref 98–107)
CHOLEST SERPL-MCNC: 200 MG/DL
CREAT SERPL-MCNC: 1.19 MG/DL (ref 0.67–1.17)
EGFRCR SERPLBLD CKD-EPI 2021: 73 ML/MIN/1.73M2
EOSINOPHIL # BLD AUTO: 0.1 10E3/UL (ref 0–0.7)
EOSINOPHIL NFR BLD AUTO: 1 %
ERYTHROCYTE [DISTWIDTH] IN BLOOD BY AUTOMATED COUNT: 12.8 % (ref 10–15)
EST. AVERAGE GLUCOSE BLD GHB EST-MCNC: 108 MG/DL
FASTING STATUS PATIENT QL REPORTED: NO
FASTING STATUS PATIENT QL REPORTED: NO
GLUCOSE SERPL-MCNC: 98 MG/DL (ref 70–99)
HBA1C MFR BLD: 5.4 %
HCO3 SERPL-SCNC: 27 MMOL/L (ref 22–29)
HCT VFR BLD AUTO: 45.9 % (ref 40–53)
HDLC SERPL-MCNC: 76 MG/DL
HGB BLD-MCNC: 14.9 G/DL (ref 13.3–17.7)
IMM GRANULOCYTES # BLD: 0 10E3/UL
IMM GRANULOCYTES NFR BLD: 0 %
LDLC SERPL CALC-MCNC: 106 MG/DL
LYMPHOCYTES # BLD AUTO: 1.4 10E3/UL (ref 0.8–5.3)
LYMPHOCYTES NFR BLD AUTO: 17 %
MCH RBC QN AUTO: 29.7 PG (ref 26.5–33)
MCHC RBC AUTO-ENTMCNC: 32.5 G/DL (ref 31.5–36.5)
MCV RBC AUTO: 91 FL (ref 78–100)
MONOCYTES # BLD AUTO: 0.5 10E3/UL (ref 0–1.3)
MONOCYTES NFR BLD AUTO: 6 %
NEUTROPHILS # BLD AUTO: 6.4 10E3/UL (ref 1.6–8.3)
NEUTROPHILS NFR BLD AUTO: 75 %
NONHDLC SERPL-MCNC: 124 MG/DL
NRBC # BLD AUTO: 0 10E3/UL
NRBC BLD AUTO-RTO: 0 /100
PLATELET # BLD AUTO: 262 10E3/UL (ref 150–450)
POTASSIUM SERPL-SCNC: 4.9 MMOL/L (ref 3.4–5.3)
PROT SERPL-MCNC: 6.9 G/DL (ref 6.4–8.3)
PSA SERPL DL<=0.01 NG/ML-MCNC: 0.71 NG/ML (ref 0–3.5)
RBC # BLD AUTO: 5.02 10E6/UL (ref 4.4–5.9)
SODIUM SERPL-SCNC: 138 MMOL/L (ref 135–145)
TRIGL SERPL-MCNC: 91 MG/DL
TSH SERPL DL<=0.005 MIU/L-ACNC: 2.21 UIU/ML (ref 0.3–4.2)
VIT D+METAB SERPL-MCNC: 83 NG/ML (ref 20–50)
WBC # BLD AUTO: 8.5 10E3/UL (ref 4–11)

## 2024-12-09 PROCEDURE — 82374 ASSAY BLOOD CARBON DIOXIDE: CPT | Mod: ZL | Performed by: STUDENT IN AN ORGANIZED HEALTH CARE EDUCATION/TRAINING PROGRAM

## 2024-12-09 PROCEDURE — 85004 AUTOMATED DIFF WBC COUNT: CPT | Mod: ZL | Performed by: STUDENT IN AN ORGANIZED HEALTH CARE EDUCATION/TRAINING PROGRAM

## 2024-12-09 PROCEDURE — 83036 HEMOGLOBIN GLYCOSYLATED A1C: CPT | Mod: ZL | Performed by: STUDENT IN AN ORGANIZED HEALTH CARE EDUCATION/TRAINING PROGRAM

## 2024-12-09 PROCEDURE — 84443 ASSAY THYROID STIM HORMONE: CPT | Mod: ZL | Performed by: STUDENT IN AN ORGANIZED HEALTH CARE EDUCATION/TRAINING PROGRAM

## 2024-12-09 PROCEDURE — 36415 COLL VENOUS BLD VENIPUNCTURE: CPT | Mod: ZL | Performed by: STUDENT IN AN ORGANIZED HEALTH CARE EDUCATION/TRAINING PROGRAM

## 2024-12-09 PROCEDURE — 80061 LIPID PANEL: CPT | Mod: ZL | Performed by: STUDENT IN AN ORGANIZED HEALTH CARE EDUCATION/TRAINING PROGRAM

## 2024-12-09 PROCEDURE — G0103 PSA SCREENING: HCPCS | Mod: ZL | Performed by: STUDENT IN AN ORGANIZED HEALTH CARE EDUCATION/TRAINING PROGRAM

## 2024-12-09 PROCEDURE — 85014 HEMATOCRIT: CPT | Mod: ZL | Performed by: STUDENT IN AN ORGANIZED HEALTH CARE EDUCATION/TRAINING PROGRAM

## 2024-12-09 PROCEDURE — 82040 ASSAY OF SERUM ALBUMIN: CPT | Mod: ZL | Performed by: STUDENT IN AN ORGANIZED HEALTH CARE EDUCATION/TRAINING PROGRAM

## 2024-12-09 PROCEDURE — 82306 VITAMIN D 25 HYDROXY: CPT | Mod: ZL | Performed by: STUDENT IN AN ORGANIZED HEALTH CARE EDUCATION/TRAINING PROGRAM

## 2024-12-09 RX ORDER — ROSUVASTATIN CALCIUM 40 MG/1
40 TABLET, COATED ORAL DAILY
Qty: 90 TABLET | Refills: 4 | Status: SHIPPED | OUTPATIENT
Start: 2024-12-09

## 2024-12-09 RX ORDER — LISINOPRIL 20 MG/1
20 TABLET ORAL DAILY
Qty: 90 TABLET | Refills: 4 | Status: SHIPPED | OUTPATIENT
Start: 2024-12-09

## 2024-12-09 SDOH — HEALTH STABILITY: PHYSICAL HEALTH: ON AVERAGE, HOW MANY MINUTES DO YOU ENGAGE IN EXERCISE AT THIS LEVEL?: 20 MIN

## 2024-12-09 SDOH — HEALTH STABILITY: PHYSICAL HEALTH: ON AVERAGE, HOW MANY DAYS PER WEEK DO YOU ENGAGE IN MODERATE TO STRENUOUS EXERCISE (LIKE A BRISK WALK)?: 3 DAYS

## 2024-12-09 ASSESSMENT — SOCIAL DETERMINANTS OF HEALTH (SDOH): HOW OFTEN DO YOU GET TOGETHER WITH FRIENDS OR RELATIVES?: TWICE A WEEK

## 2024-12-09 ASSESSMENT — PAIN SCALES - GENERAL: PAINLEVEL_OUTOF10: NO PAIN (0)

## 2024-12-09 NOTE — LETTER
December 10, 2024      Bryon Graves  [address] [facility] [address]        Dear ,    We are writing to inform you of your test results.    It looks like your vitamin D levels are little too high.  Please reduce your dose to 1000 to 2000 units daily.    Resulted Orders   Lipid Profile   Result Value Ref Range    Cholesterol 200 (H) <200 mg/dL    Triglycerides 91 <150 mg/dL    Direct Measure HDL 76 >=40 mg/dL    LDL Cholesterol Calculated 106 (H) <100 mg/dL    Non HDL Cholesterol 124 <130 mg/dL    Patient Fasting > 8hrs? No       Comment:      Sugar with coffee  Sugar with coffee  Sugar with coffee    Narrative    Cholesterol  Desirable: < 200 mg/dL  Borderline High: 200 - 239 mg/dL  High: >= 240 mg/dL    Triglycerides  Normal: < 150 mg/dL  Borderline High: 150 - 199 mg/dL  High: 200-499 mg/dL  Very High: >= 500 mg/dL    Direct Measure HDL  Female: >= 50 mg/dL   Male: >= 40 mg/dL    LDL Cholesterol  Desirable: < 100 mg/dL  Above Desirable: 100 - 129 mg/dL   Borderline High: 130 - 159 mg/dL   High:  160 - 189 mg/dL   Very High: >= 190 mg/dL    Non HDL Cholesterol  Desirable: < 130 mg/dL  Above Desirable: 130 - 159 mg/dL  Borderline High: 160 - 189 mg/dL  High: 190 - 219 mg/dL  Very High: >= 220 mg/dL   Prostate Specific Antigen Screen   Result Value Ref Range    Prostate Specific Antigen Screen 0.71 0.00 - 3.50 ng/mL    Narrative    This result is obtained using the Roche Elecsys total PSA method on the deisi e601 immunoassay analyzer, which is an ultrasensitive method. Results obtained with different assay methods or kits cannot be used interchangeably.  This test is intended for initial prostate cancer screening. PSA values exceeding the age-specific limits are suspicious for prostate disease, but additional testing, such as prostate biopsy, is needed to diagnose prostate pathology. The American Cancer Society recommends annual examination with digital rectal examination and serum PSA beginning at  age 50 and for men with a life expectancy of at least 10 years after detection of prostate cancer. For men in high-risk groups, such as  Americans or men with a first-degree relative diagnosed at a younger age, testing should begin at a younger age. It is generally recommended that information be provided to patients about the benefits and limitations of testing and treatment so they can make informed decisions.   Comprehensive metabolic panel   Result Value Ref Range    Sodium 138 135 - 145 mmol/L    Potassium 4.9 3.4 - 5.3 mmol/L    Carbon Dioxide (CO2) 27 22 - 29 mmol/L    Anion Gap 5 (L) 7 - 15 mmol/L    Urea Nitrogen 21.0 (H) 6.0 - 20.0 mg/dL    Creatinine 1.19 (H) 0.67 - 1.17 mg/dL    GFR Estimate 73 >60 mL/min/1.73m2      Comment:      eGFR calculated using 2021 CKD-EPI equation.    Calcium 9.7 8.8 - 10.4 mg/dL      Comment:      Reference intervals for this test were updated on 7/16/2024 to reflect our healthy population more accurately. There may be differences in the flagging of prior results with similar values performed with this method. Those prior results can be interpreted in the context of the updated reference intervals.    Chloride 106 98 - 107 mmol/L    Glucose 98 70 - 99 mg/dL    Alkaline Phosphatase 79 40 - 150 U/L    AST 27 0 - 45 U/L    ALT 36 0 - 70 U/L    Protein Total 6.9 6.4 - 8.3 g/dL    Albumin 4.7 3.5 - 5.2 g/dL    Bilirubin Total 0.6 <=1.2 mg/dL    Patient Fasting > 8hrs? No       Comment:      Sugar with coffee   Hemoglobin A1c   Result Value Ref Range    Estimated Average Glucose 108 <117 mg/dL    Hemoglobin A1C 5.4 <5.7 %      Comment:      Normal <5.7%   Prediabetes 5.7-6.4%    Diabetes 6.5% or higher     Note: Adopted from ADA consensus guidelines.   Vitamin D Total   Result Value Ref Range    Vitamin D, Total (25-Hydroxy) 83 (H) 20 - 50 ng/mL      Comment:      toxicity possible    Narrative    Season, race, dietary intake, and treatment affect the concentration of  25-hydroxy-Vitamin D. Values may decrease during winter months and increase during summer months.    Vitamin D determination is routinely performed by an immunoassay specific for 25 hydroxyvitamin D3.  If an individual is on vitamin D2(ergocalciferol) supplementation, please specify 25 OH vitamin D2 and D3 level determination by LCMSMS test VITD23.     TSH Reflex GH   Result Value Ref Range    TSH 2.21 0.30 - 4.20 uIU/mL   CBC with platelets and differential   Result Value Ref Range    WBC Count 8.5 4.0 - 11.0 10e3/uL    RBC Count 5.02 4.40 - 5.90 10e6/uL    Hemoglobin 14.9 13.3 - 17.7 g/dL    Hematocrit 45.9 40.0 - 53.0 %    MCV 91 78 - 100 fL    MCH 29.7 26.5 - 33.0 pg    MCHC 32.5 31.5 - 36.5 g/dL    RDW 12.8 10.0 - 15.0 %    Platelet Count 262 150 - 450 10e3/uL    % Neutrophils 75 %    % Lymphocytes 17 %    % Monocytes 6 %    % Eosinophils 1 %    % Basophils 1 %    % Immature Granulocytes 0 %    NRBCs per 100 WBC 0 <1 /100    Absolute Neutrophils 6.4 1.6 - 8.3 10e3/uL    Absolute Lymphocytes 1.4 0.8 - 5.3 10e3/uL    Absolute Monocytes 0.5 0.0 - 1.3 10e3/uL    Absolute Eosinophils 0.1 0.0 - 0.7 10e3/uL    Absolute Basophils 0.1 0.0 - 0.2 10e3/uL    Absolute Immature Granulocytes 0.0 <=0.4 10e3/uL    Absolute NRBCs 0.0 10e3/uL       If you have any questions or concerns, please call the clinic at the number listed above.       Sincerely,      Andriy Hernandez MD

## 2024-12-09 NOTE — NURSING NOTE
"Chief Complaint   Patient presents with    Physical       Initial /76   Pulse 83   Temp 97.9  F (36.6  C) (Temporal)   Resp 16   Ht 1.778 m (5' 10\")   Wt 99.1 kg (218 lb 6.4 oz)   SpO2 98%   BMI 31.34 kg/m   Estimated body mass index is 31.34 kg/m  as calculated from the following:    Height as of this encounter: 1.778 m (5' 10\").    Weight as of this encounter: 99.1 kg (218 lb 6.4 oz).  Medication Review: complete    The next two questions are to help us understand your food security.  If you are feeling you need any assistance in this area, we have resources available to support you today.          12/9/2024   SDOH- Food Insecurity   Within the past 12 months, did you worry that your food would run out before you got money to buy more? N    Within the past 12 months, did the food you bought just not last and you didn t have money to get more? N        Patient-reported         Health Care Directive:  Patient does not have a Health Care Directive: Discussed advance care planning with patient; information given to patient to review.    Rebekah Meyer LPN      "

## 2024-12-09 NOTE — LETTER
December 9, 2024      Bryon Graves  [address] [facility] [address]        Dear ,    We are writing to inform you of your test results.    Your laboratory results are below.  Overall everything looks very good.  You screened negative for prostate cancer, blood counts etc. are all normal.  Your cholesterol still remains fairly high.  I would like your lipids to be fasting next year.  We may need to add on Zetia in the future to help get your cholesterol down, but no change for this year.    Resulted Orders   Lipid Profile   Result Value Ref Range    Cholesterol 200 (H) <200 mg/dL    Triglycerides 91 <150 mg/dL    Direct Measure HDL 76 >=40 mg/dL    LDL Cholesterol Calculated 106 (H) <100 mg/dL    Non HDL Cholesterol 124 <130 mg/dL    Patient Fasting > 8hrs? No       Comment:      Sugar with coffee  Sugar with coffee  Sugar with coffee    Narrative    Cholesterol  Desirable: < 200 mg/dL  Borderline High: 200 - 239 mg/dL  High: >= 240 mg/dL    Triglycerides  Normal: < 150 mg/dL  Borderline High: 150 - 199 mg/dL  High: 200-499 mg/dL  Very High: >= 500 mg/dL    Direct Measure HDL  Female: >= 50 mg/dL   Male: >= 40 mg/dL    LDL Cholesterol  Desirable: < 100 mg/dL  Above Desirable: 100 - 129 mg/dL   Borderline High: 130 - 159 mg/dL   High:  160 - 189 mg/dL   Very High: >= 190 mg/dL    Non HDL Cholesterol  Desirable: < 130 mg/dL  Above Desirable: 130 - 159 mg/dL  Borderline High: 160 - 189 mg/dL  High: 190 - 219 mg/dL  Very High: >= 220 mg/dL   Prostate Specific Antigen Screen   Result Value Ref Range    Prostate Specific Antigen Screen 0.71 0.00 - 3.50 ng/mL    Narrative    This result is obtained using the Roche Elecsys total PSA method on the deisi e601 immunoassay analyzer, which is an ultrasensitive method. Results obtained with different assay methods or kits cannot be used interchangeably.  This test is intended for initial prostate cancer screening. PSA values exceeding the age-specific limits are  suspicious for prostate disease, but additional testing, such as prostate biopsy, is needed to diagnose prostate pathology. The American Cancer Society recommends annual examination with digital rectal examination and serum PSA beginning at age 50 and for men with a life expectancy of at least 10 years after detection of prostate cancer. For men in high-risk groups, such as  Americans or men with a first-degree relative diagnosed at a younger age, testing should begin at a younger age. It is generally recommended that information be provided to patients about the benefits and limitations of testing and treatment so they can make informed decisions.   Comprehensive metabolic panel   Result Value Ref Range    Sodium 138 135 - 145 mmol/L    Potassium 4.9 3.4 - 5.3 mmol/L    Carbon Dioxide (CO2) 27 22 - 29 mmol/L    Anion Gap 5 (L) 7 - 15 mmol/L    Urea Nitrogen 21.0 (H) 6.0 - 20.0 mg/dL    Creatinine 1.19 (H) 0.67 - 1.17 mg/dL    GFR Estimate 73 >60 mL/min/1.73m2      Comment:      eGFR calculated using 2021 CKD-EPI equation.    Calcium 9.7 8.8 - 10.4 mg/dL      Comment:      Reference intervals for this test were updated on 7/16/2024 to reflect our healthy population more accurately. There may be differences in the flagging of prior results with similar values performed with this method. Those prior results can be interpreted in the context of the updated reference intervals.    Chloride 106 98 - 107 mmol/L    Glucose 98 70 - 99 mg/dL    Alkaline Phosphatase 79 40 - 150 U/L    AST 27 0 - 45 U/L    ALT 36 0 - 70 U/L    Protein Total 6.9 6.4 - 8.3 g/dL    Albumin 4.7 3.5 - 5.2 g/dL    Bilirubin Total 0.6 <=1.2 mg/dL    Patient Fasting > 8hrs? No       Comment:      Sugar with coffee   Hemoglobin A1c   Result Value Ref Range    Estimated Average Glucose 108 <117 mg/dL    Hemoglobin A1C 5.4 <5.7 %      Comment:      Normal <5.7%   Prediabetes 5.7-6.4%    Diabetes 6.5% or higher     Note: Adopted from ADA consensus  guidelines.   TSH Reflex GH   Result Value Ref Range    TSH 2.21 0.30 - 4.20 uIU/mL   CBC with platelets and differential   Result Value Ref Range    WBC Count 8.5 4.0 - 11.0 10e3/uL    RBC Count 5.02 4.40 - 5.90 10e6/uL    Hemoglobin 14.9 13.3 - 17.7 g/dL    Hematocrit 45.9 40.0 - 53.0 %    MCV 91 78 - 100 fL    MCH 29.7 26.5 - 33.0 pg    MCHC 32.5 31.5 - 36.5 g/dL    RDW 12.8 10.0 - 15.0 %    Platelet Count 262 150 - 450 10e3/uL    % Neutrophils 75 %    % Lymphocytes 17 %    % Monocytes 6 %    % Eosinophils 1 %    % Basophils 1 %    % Immature Granulocytes 0 %    NRBCs per 100 WBC 0 <1 /100    Absolute Neutrophils 6.4 1.6 - 8.3 10e3/uL    Absolute Lymphocytes 1.4 0.8 - 5.3 10e3/uL    Absolute Monocytes 0.5 0.0 - 1.3 10e3/uL    Absolute Eosinophils 0.1 0.0 - 0.7 10e3/uL    Absolute Basophils 0.1 0.0 - 0.2 10e3/uL    Absolute Immature Granulocytes 0.0 <=0.4 10e3/uL    Absolute NRBCs 0.0 10e3/uL       If you have any questions or concerns, please call the clinic at the number listed above.       Sincerely,      Andriy Hernandez MD

## 2024-12-09 NOTE — PROGRESS NOTES
"Preventive Care Visit  [facility] AND [facility]  Andriy Hernandez MD, Internal Medicine  Dec 9, 2024      Assessment & Plan       ICD-10-CM    1. Annual physical exam  Z00.00 Comprehensive metabolic panel     CBC and Differential     Comprehensive metabolic panel     CBC and Differential     CANCELED: CBC with platelets      2. Vitamin D deficiency  E55.9 Vitamin D Total     Vitamin D Total      3. Prediabetes  R73.03 Prostate Specific Antigen Screen     Hemoglobin A1c     TSH Reflex GH     Prostate Specific Antigen Screen     Hemoglobin A1c     TSH Reflex GH      4. Encounter for screening for malignant neoplasm of prostate  Z12.5       5. Mixed hyperlipidemia  E78.2 Lipid Profile     rosuvastatin (CRESTOR) 40 MG tablet     Lipid Profile      6. ST elevation myocardial infarction (STEMI), unspecified artery (H)  I21.3       7. Benign essential hypertension  I10 lisinopril (ZESTRIL) 20 MG tablet        Annual physical exam: Updated past medical history surgical history social history medications.  He had Cologuard testing this year.  Blood pressure is under good control refilled his lisinopril and rosuvastatin.  Recheck lipids with the change from Zetia and atorvastatin last year.  He declines a tetanus vaccine due to reacting with that but states that he will get 1 if he has a laceration or a infected wound.  As far as his STEMI he is due for a repeat echocardiogram in about 3 years.  Ejection fraction had normalized on recheck last year.  Discontinued metoprolol last year.  Currently taking 5000 units of vitamin D, will recheck his labs to ensure he is not taking too much today.  Counseled on weight loss.          BMI  Estimated body mass index is 31.34 kg/m  as calculated from the following:    Height as of this encounter: 1.778 m (5' 10\").    Weight as of this encounter: 99.1 kg (218 lb 6.4 oz).   Weight management plan: Discussed healthy diet and exercise guidelines    Counseling  Appropriate preventive " services were addressed with this patient via screening, questionnaire, or discussion as appropriate for fall prevention, nutrition, physical activity, Tobacco-use cessation, social engagement, weight loss and cognition.  Checklist reviewing preventive services available has been given to the patient.  Reviewed patient's diet, addressing concerns and/or questions.   He is at risk for lack of exercise and has been provided with information to increase physical activity for the benefit of his well-being.           No follow-ups on file.    Subjective   Ty is a 54 year old, presenting for the following:  Physical        12/9/2024    10:48 AM   Additional Questions   Roomed by Chuy POLLARD LPN          History of Present Illness       Hyperlipidemia:  He presents for follow up of hyperlipidemia.   He is taking medication to lower cholesterol. He is not having myalgia or other side effects to statin medications.He exercises with enough effort to increase his heart rate 10 to 19 minutes per day.  He exercises with enough effort to increase his heart rate 3 or less days per week.   He is taking medications regularly.          Health Care Directive  Patient does not have a Health Care Directive: Discussed advance care planning with patient; information given to patient to review.      12/9/2024   General Health   How would you rate your overall physical health? Good   Feel stress (tense, anxious, or unable to sleep) Only a little      (!) STRESS CONCERN      12/9/2024   Nutrition   Three or more servings of calcium each day? Yes   Diet: Low fat/cholesterol    Breakfast skipped   How many servings of fruit and vegetables per day? (!) 0-1   How many sweetened beverages each day? (!) 2       Multiple values from one day are sorted in reverse-chronological order         12/9/2024   Exercise   Days per week of moderate/strenous exercise 3 days   Average minutes spent exercising at this level 20 min            12/9/2024   Social  Factors   Frequency of gathering with friends or relatives Twice a week   Worry food won't last until get money to buy more No   Food not last or not have enough money for food? No   Do you have housing? (Housing is defined as stable permanent housing and does not include staying ouside in a car, in a tent, in an abandoned building, in an overnight shelter, or couch-surfing.) Yes   Are you worried about losing your housing? No   Lack of transportation? No   Unable to get utilities (heat,electricity)? No            2024   Fall Risk   Fallen 2 or more times in the past year? No    Trouble with walking or balance? No        Patient-reported          2024   Dental   Dentist two times every year? Yes            2024   TB Screening   Were you born outside of the [place]? No            Today's PHQ-2 Score:       2024    10:51 AM   PHQ-2 (  [employer])   Q1: Little interest or pleasure in doing things 0    Q2: Feeling down, depressed or hopeless 0    PHQ-2 Score 0    Q1: Little interest or pleasure in doing things Not at all   Q2: Feeling down, depressed or hopeless Not at all   PHQ-2 Score 0       Patient-reported           2024   Substance Use   Alcohol more than 3/day or more than 7/wk No   Do you use any other substances recreationally? No        Social History     Tobacco Use    Smoking status: Former     Types: Cigars     Quit date: 2020     Years since quittin.9    Smokeless tobacco: Never    Tobacco comments:     Quit smoking: cigars occasional during deer hunting. Stopped after STEMI   Vaping Use    Vaping status: Never Used   Substance Use Topics    Alcohol use: Yes     Comment: most weekends-no weekdays    Drug use: Never           2024   STI Screening   New sexual partner(s) since last STI/HIV test? 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    Weight as of this encounter: 99.1 kg (218 lb 6.4 oz).    Physical Exam  Vitals and nursing note reviewed.   Constitutional:       General: He is not in acute distress.     Appearance: He is well-developed. He is not diaphoretic.   HENT:      Head: Normocephalic and atraumatic.      Right Ear: Tympanic membrane, ear canal and external ear normal.      Left Ear: Tympanic membrane, ear canal and external ear normal.      Mouth/Throat:      Mouth: Mucous membranes are moist.      Pharynx: Oropharynx is clear.   Eyes:      General: No scleral icterus.     Conjunctiva/sclera: Conjunctivae normal.   Cardiovascular:      Rate and Rhythm: Normal rate and regular rhythm.      Heart sounds: No murmur heard.  Pulmonary:      Effort: Pulmonary effort is normal.      Breath sounds: Normal breath sounds. No wheezing.   Abdominal:      Palpations: Abdomen is soft. There is no mass.      Tenderness: There is no abdominal tenderness.   Musculoskeletal:         General: No deformity.      Cervical back: Neck supple.   Lymphadenopathy:      Cervical: No cervical " adenopathy.   Skin:     General: Skin is warm and dry.      Coloration: Skin is not jaundiced.      Findings: No rash.      Comments: Scattered dermatofibromas, no other atypical lesions.   Neurological:      Mental Status: He is alert and oriented to person, place, and time. Mental status is at baseline.   Psychiatric:         Mood and Affect: Mood normal.         Behavior: Behavior normal.         Thought Content: Thought content normal.               Signed Electronically by: Andriy Hernandez MD

## (undated) RX ORDER — NITROGLYCERIN 10 MG/100ML
INJECTION INTRAVENOUS
Status: DISPENSED
Start: 2020-01-11

## (undated) RX ORDER — METOPROLOL TARTRATE 25 MG/1
TABLET, FILM COATED ORAL
Status: DISPENSED
Start: 2020-01-11

## (undated) RX ORDER — MORPHINE SULFATE 2 MG/ML
INJECTION, SOLUTION INTRAMUSCULAR; INTRAVENOUS
Status: DISPENSED
Start: 2020-01-11

## (undated) RX ORDER — SODIUM CHLORIDE 9 MG/ML
INJECTION, SOLUTION INTRAVENOUS
Status: DISPENSED
Start: 2020-01-11